# Patient Record
Sex: FEMALE | Race: WHITE | NOT HISPANIC OR LATINO | URBAN - METROPOLITAN AREA
[De-identification: names, ages, dates, MRNs, and addresses within clinical notes are randomized per-mention and may not be internally consistent; named-entity substitution may affect disease eponyms.]

---

## 2018-09-01 ENCOUNTER — INPATIENT (INPATIENT)
Facility: HOSPITAL | Age: 83
LOS: 10 days | Discharge: NURSING, REHAB OR LONG-TERM CARE | DRG: 563 | End: 2018-09-12
Attending: STUDENT IN AN ORGANIZED HEALTH CARE EDUCATION/TRAINING PROGRAM | Admitting: STUDENT IN AN ORGANIZED HEALTH CARE EDUCATION/TRAINING PROGRAM
Payer: MEDICARE

## 2018-09-01 VITALS
DIASTOLIC BLOOD PRESSURE: 68 MMHG | RESPIRATION RATE: 16 BRPM | TEMPERATURE: 98 F | OXYGEN SATURATION: 96 % | HEART RATE: 52 BPM | SYSTOLIC BLOOD PRESSURE: 97 MMHG | WEIGHT: 110.01 LBS

## 2018-09-01 DIAGNOSIS — I10 ESSENTIAL (PRIMARY) HYPERTENSION: ICD-10-CM

## 2018-09-01 DIAGNOSIS — N39.0 URINARY TRACT INFECTION, SITE NOT SPECIFIED: ICD-10-CM

## 2018-09-01 DIAGNOSIS — Z91.89 OTHER SPECIFIED PERSONAL RISK FACTORS, NOT ELSEWHERE CLASSIFIED: ICD-10-CM

## 2018-09-01 DIAGNOSIS — F32.9 MAJOR DEPRESSIVE DISORDER, SINGLE EPISODE, UNSPECIFIED: ICD-10-CM

## 2018-09-01 DIAGNOSIS — E03.9 HYPOTHYROIDISM, UNSPECIFIED: ICD-10-CM

## 2018-09-01 DIAGNOSIS — K21.9 GASTRO-ESOPHAGEAL REFLUX DISEASE WITHOUT ESOPHAGITIS: ICD-10-CM

## 2018-09-01 DIAGNOSIS — R63.8 OTHER SYMPTOMS AND SIGNS CONCERNING FOOD AND FLUID INTAKE: ICD-10-CM
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
## 2018-09-01 DIAGNOSIS — Z29.9 ENCOUNTER FOR PROPHYLACTIC MEASURES, UNSPECIFIED: ICD-10-CM

## 2018-09-01 LAB
ALBUMIN SERPL ELPH-MCNC: 3.8 G/DL — SIGNIFICANT CHANGE UP (ref 3.3–5)
ALP SERPL-CCNC: 54 U/L — SIGNIFICANT CHANGE UP (ref 40–120)
ALT FLD-CCNC: 10 U/L — SIGNIFICANT CHANGE UP (ref 10–45)
ANION GAP SERPL CALC-SCNC: 12 MMOL/L — SIGNIFICANT CHANGE UP (ref 5–17)
APPEARANCE UR: CLEAR — SIGNIFICANT CHANGE UP
APTT BLD: 29.7 SEC — SIGNIFICANT CHANGE UP (ref 27.5–37.4)
AST SERPL-CCNC: 14 U/L — SIGNIFICANT CHANGE UP (ref 10–40)
BASOPHILS NFR BLD AUTO: 0.4 % — SIGNIFICANT CHANGE UP (ref 0–2)
BILIRUB SERPL-MCNC: 0.3 MG/DL — SIGNIFICANT CHANGE UP (ref 0.2–1.2)
BILIRUB UR-MCNC: NEGATIVE — SIGNIFICANT CHANGE UP
BUN SERPL-MCNC: 28 MG/DL — HIGH (ref 7–23)
CALCIUM SERPL-MCNC: 9.7 MG/DL — SIGNIFICANT CHANGE UP (ref 8.4–10.5)
CHLORIDE SERPL-SCNC: 96 MMOL/L — SIGNIFICANT CHANGE UP (ref 96–108)
CO2 SERPL-SCNC: 27 MMOL/L — SIGNIFICANT CHANGE UP (ref 22–31)
COLOR SPEC: YELLOW — SIGNIFICANT CHANGE UP
CREAT SERPL-MCNC: 0.92 MG/DL — SIGNIFICANT CHANGE UP (ref 0.5–1.3)
DIFF PNL FLD: ABNORMAL
EOSINOPHIL NFR BLD AUTO: 3.6 % — SIGNIFICANT CHANGE UP (ref 0–6)
GLUCOSE SERPL-MCNC: 104 MG/DL — HIGH (ref 70–99)
GLUCOSE UR QL: NEGATIVE — SIGNIFICANT CHANGE UP
HCT VFR BLD CALC: 36.4 % — SIGNIFICANT CHANGE UP (ref 34.5–45)
HGB BLD-MCNC: 12.1 G/DL — SIGNIFICANT CHANGE UP (ref 11.5–15.5)
INR BLD: 1.04 — SIGNIFICANT CHANGE UP (ref 0.88–1.16)
KETONES UR-MCNC: NEGATIVE — SIGNIFICANT CHANGE UP
LEUKOCYTE ESTERASE UR-ACNC: ABNORMAL
LYMPHOCYTES # BLD AUTO: 18.6 % — SIGNIFICANT CHANGE UP (ref 13–44)
MCHC RBC-ENTMCNC: 31.2 PG — SIGNIFICANT CHANGE UP (ref 27–34)
MCHC RBC-ENTMCNC: 33.2 G/DL — SIGNIFICANT CHANGE UP (ref 32–36)
MCV RBC AUTO: 93.8 FL — SIGNIFICANT CHANGE UP (ref 80–100)
MONOCYTES NFR BLD AUTO: 12 % — SIGNIFICANT CHANGE UP (ref 2–14)
NEUTROPHILS NFR BLD AUTO: 65.4 % — SIGNIFICANT CHANGE UP (ref 43–77)
NITRITE UR-MCNC: NEGATIVE — SIGNIFICANT CHANGE UP
PH UR: 6 — SIGNIFICANT CHANGE UP (ref 5–8)
PLATELET # BLD AUTO: 244 K/UL — SIGNIFICANT CHANGE UP (ref 150–400)
POTASSIUM SERPL-MCNC: 4.3 MMOL/L — SIGNIFICANT CHANGE UP (ref 3.5–5.3)
POTASSIUM SERPL-SCNC: 4.3 MMOL/L — SIGNIFICANT CHANGE UP (ref 3.5–5.3)
PROT SERPL-MCNC: 7.2 G/DL — SIGNIFICANT CHANGE UP (ref 6–8.3)
PROT UR-MCNC: NEGATIVE MG/DL — SIGNIFICANT CHANGE UP
PROTHROM AB SERPL-ACNC: 11.6 SEC — SIGNIFICANT CHANGE UP (ref 9.8–12.7)
RBC # BLD: 3.88 M/UL — SIGNIFICANT CHANGE UP (ref 3.8–5.2)
RBC # FLD: 14.2 % — SIGNIFICANT CHANGE UP (ref 10.3–16.9)
SODIUM SERPL-SCNC: 135 MMOL/L — SIGNIFICANT CHANGE UP (ref 135–145)
SP GR SPEC: <=1.005 — SIGNIFICANT CHANGE UP (ref 1–1.03)
TROPONIN T SERPL-MCNC: 0.01 NG/ML — SIGNIFICANT CHANGE UP (ref 0–0.01)
TSH SERPL-MCNC: 1.19 UIU/ML — SIGNIFICANT CHANGE UP (ref 0.35–4.94)
UROBILINOGEN FLD QL: 0.2 E.U./DL — SIGNIFICANT CHANGE UP
WBC # BLD: 7.6 K/UL — SIGNIFICANT CHANGE UP (ref 3.8–10.5)
WBC # FLD AUTO: 7.6 K/UL — SIGNIFICANT CHANGE UP (ref 3.8–10.5)

## 2018-09-01 PROCEDURE — 93010 ELECTROCARDIOGRAM REPORT: CPT

## 2018-09-01 PROCEDURE — 99285 EMERGENCY DEPT VISIT HI MDM: CPT | Mod: 25

## 2018-09-01 PROCEDURE — 70450 CT HEAD/BRAIN W/O DYE: CPT | Mod: 26

## 2018-09-01 PROCEDURE — 71045 X-RAY EXAM CHEST 1 VIEW: CPT | Mod: 26

## 2018-09-01 PROCEDURE — 73030 X-RAY EXAM OF SHOULDER: CPT | Mod: 26,RT

## 2018-09-01 PROCEDURE — 72170 X-RAY EXAM OF PELVIS: CPT | Mod: 26

## 2018-09-01 PROCEDURE — 99223 1ST HOSP IP/OBS HIGH 75: CPT | Mod: GC

## 2018-09-01 PROCEDURE — 72125 CT NECK SPINE W/O DYE: CPT | Mod: 26

## 2018-09-01 RX ORDER — CEFTRIAXONE 500 MG/1
1 INJECTION, POWDER, FOR SOLUTION INTRAMUSCULAR; INTRAVENOUS EVERY 24 HOURS
Qty: 0 | Refills: 0 | Status: DISCONTINUED | OUTPATIENT
Start: 2018-09-01 | End: 2018-09-04

## 2018-09-01 RX ORDER — MIRTAZAPINE 45 MG/1
15 TABLET, ORALLY DISINTEGRATING ORAL DAILY
Qty: 0 | Refills: 0 | Status: DISCONTINUED | OUTPATIENT
Start: 2018-09-01 | End: 2018-09-12

## 2018-09-01 RX ORDER — MIRTAZAPINE 45 MG/1
15 TABLET, ORALLY DISINTEGRATING ORAL
Qty: 0 | Refills: 0 | COMMUNITY

## 2018-09-01 RX ORDER — AMLODIPINE BESYLATE 2.5 MG/1
2.5 TABLET ORAL
Qty: 0 | Refills: 0 | COMMUNITY

## 2018-09-01 RX ORDER — ESCITALOPRAM OXALATE 10 MG/1
0 TABLET, FILM COATED ORAL
Qty: 0 | Refills: 0 | COMMUNITY

## 2018-09-01 RX ORDER — PANTOPRAZOLE SODIUM 20 MG/1
40 TABLET, DELAYED RELEASE ORAL
Qty: 0 | Refills: 0 | Status: DISCONTINUED | OUTPATIENT
Start: 2018-09-01 | End: 2018-09-12

## 2018-09-01 RX ORDER — LISINOPRIL 2.5 MG/1
0 TABLET ORAL
Qty: 0 | Refills: 0 | COMMUNITY

## 2018-09-01 RX ORDER — TETANUS TOXOID, REDUCED DIPHTHERIA TOXOID AND ACELLULAR PERTUSSIS VACCINE, ADSORBED 5; 2.5; 8; 8; 2.5 [IU]/.5ML; [IU]/.5ML; UG/.5ML; UG/.5ML; UG/.5ML
0.5 SUSPENSION INTRAMUSCULAR ONCE
Qty: 0 | Refills: 0 | Status: COMPLETED | OUTPATIENT
Start: 2018-09-01 | End: 2018-09-01

## 2018-09-01 RX ORDER — ESOMEPRAZOLE MAGNESIUM 40 MG/1
0 CAPSULE, DELAYED RELEASE ORAL
Qty: 0 | Refills: 0 | COMMUNITY

## 2018-09-01 RX ORDER — ESCITALOPRAM OXALATE 10 MG/1
5 TABLET, FILM COATED ORAL DAILY
Qty: 0 | Refills: 0 | Status: DISCONTINUED | OUTPATIENT
Start: 2018-09-01 | End: 2018-09-12

## 2018-09-01 RX ORDER — MIRTAZAPINE 45 MG/1
0 TABLET, ORALLY DISINTEGRATING ORAL
Qty: 0 | Refills: 0 | COMMUNITY

## 2018-09-01 RX ORDER — THYROID 120 MG
1 TABLET ORAL
Qty: 0 | Refills: 0 | COMMUNITY

## 2018-09-01 RX ORDER — IBUPROFEN 200 MG
600 TABLET ORAL ONCE
Qty: 0 | Refills: 0 | Status: COMPLETED | OUTPATIENT
Start: 2018-09-01 | End: 2018-09-01

## 2018-09-01 RX ORDER — FUROSEMIDE 40 MG
1 TABLET ORAL
Qty: 0 | Refills: 0 | COMMUNITY

## 2018-09-01 RX ORDER — SODIUM CHLORIDE 9 MG/ML
500 INJECTION INTRAMUSCULAR; INTRAVENOUS; SUBCUTANEOUS ONCE
Qty: 0 | Refills: 0 | Status: DISCONTINUED | OUTPATIENT
Start: 2018-09-01 | End: 2018-09-01

## 2018-09-01 RX ORDER — LEVOTHYROXINE SODIUM 125 MCG
25 TABLET ORAL DAILY
Qty: 0 | Refills: 0 | Status: DISCONTINUED | OUTPATIENT
Start: 2018-09-01 | End: 2018-09-12

## 2018-09-01 RX ORDER — HEPARIN SODIUM 5000 [USP'U]/ML
5000 INJECTION INTRAVENOUS; SUBCUTANEOUS EVERY 12 HOURS
Qty: 0 | Refills: 0 | Status: DISCONTINUED | OUTPATIENT
Start: 2018-09-01 | End: 2018-09-12

## 2018-09-01 RX ORDER — LISINOPRIL 2.5 MG/1
40 TABLET ORAL
Qty: 0 | Refills: 0 | COMMUNITY

## 2018-09-01 RX ORDER — ASPIRIN/CALCIUM CARB/MAGNESIUM 324 MG
1 TABLET ORAL
Qty: 0 | Refills: 0 | COMMUNITY

## 2018-09-01 RX ORDER — ESCITALOPRAM OXALATE 10 MG/1
5 TABLET, FILM COATED ORAL
Qty: 0 | Refills: 0 | COMMUNITY

## 2018-09-01 RX ORDER — ESOMEPRAZOLE MAGNESIUM 40 MG/1
40 CAPSULE, DELAYED RELEASE ORAL
Qty: 0 | Refills: 0 | COMMUNITY

## 2018-09-01 RX ORDER — INFLUENZA VIRUS VACCINE 15; 15; 15; 15 UG/.5ML; UG/.5ML; UG/.5ML; UG/.5ML
0.5 SUSPENSION INTRAMUSCULAR ONCE
Qty: 0 | Refills: 0 | Status: DISCONTINUED | OUTPATIENT
Start: 2018-09-01 | End: 2018-09-05

## 2018-09-01 RX ORDER — RANITIDINE HYDROCHLORIDE 150 MG/1
1 TABLET, FILM COATED ORAL
Qty: 0 | Refills: 0 | COMMUNITY

## 2018-09-01 RX ORDER — ACETAMINOPHEN 500 MG
975 TABLET ORAL ONCE
Qty: 0 | Refills: 0 | Status: COMPLETED | OUTPATIENT
Start: 2018-09-01 | End: 2018-09-01

## 2018-09-01 RX ORDER — AMLODIPINE BESYLATE 2.5 MG/1
0 TABLET ORAL
Qty: 0 | Refills: 0 | COMMUNITY

## 2018-09-01 RX ORDER — ACETAMINOPHEN 500 MG
650 TABLET ORAL EVERY 6 HOURS
Qty: 0 | Refills: 0 | Status: DISCONTINUED | OUTPATIENT
Start: 2018-09-01 | End: 2018-09-12

## 2018-09-01 RX ADMIN — Medication 975 MILLIGRAM(S): at 10:05

## 2018-09-01 RX ADMIN — HEPARIN SODIUM 5000 UNIT(S): 5000 INJECTION INTRAVENOUS; SUBCUTANEOUS at 17:05

## 2018-09-01 RX ADMIN — SODIUM CHLORIDE 500 MILLILITER(S): 9 INJECTION INTRAMUSCULAR; INTRAVENOUS; SUBCUTANEOUS at 08:33

## 2018-09-01 RX ADMIN — Medication 600 MILLIGRAM(S): at 20:00

## 2018-09-01 RX ADMIN — SODIUM CHLORIDE 500 MILLILITER(S): 9 INJECTION INTRAMUSCULAR; INTRAVENOUS; SUBCUTANEOUS at 12:28

## 2018-09-01 RX ADMIN — TETANUS TOXOID, REDUCED DIPHTHERIA TOXOID AND ACELLULAR PERTUSSIS VACCINE, ADSORBED 0.5 MILLILITER(S): 5; 2.5; 8; 8; 2.5 SUSPENSION INTRAMUSCULAR at 07:33

## 2018-09-01 RX ADMIN — CEFTRIAXONE 100 GRAM(S): 500 INJECTION, POWDER, FOR SOLUTION INTRAMUSCULAR; INTRAVENOUS at 16:16

## 2018-09-01 RX ADMIN — Medication 600 MILLIGRAM(S): at 19:30

## 2018-09-01 RX ADMIN — Medication 650 MILLIGRAM(S): at 14:27

## 2018-09-01 RX ADMIN — Medication 975 MILLIGRAM(S): at 12:28

## 2018-09-01 RX ADMIN — Medication 650 MILLIGRAM(S): at 15:00

## 2018-09-01 NOTE — ED PROVIDER NOTE - ATTENDING CONTRIBUTION TO CARE
89F with home health aide, fell out of bed struck R side of head, shoulder. Doubt syncope given that pt screamed out to HHA right away. Pt's vitals notable for low heart rate, low blood pressure, pt denies cp, sob. Hx limited due to pt with dementia. Plan for screening xr including shoulder, pelvis, cxr. Plan for basic labs and head/cervical spine CT. will repair laceration on posterior scalp. Unclear cause of fall.

## 2018-09-01 NOTE — H&P ADULT - PROBLEM SELECTOR PLAN 1
-Patient presents after fall at home, likely mechanical as patient as trying to get up out of bed.  Patient with dementia and ppor historian and can't contribute to eents prior to fall.  Home health aide hearrd patient's voice immediately after fall so less likely syncope.  No evidence of cardiac event, possibly vasovagal vs dehydratino.  -Patient already received IVF and will enocurage PO intake.  -EKG with no ischemic events but read states no P waves although P waves eveident, felipe obtain another EKG.  -CT head and spine without acute pathology  -Will follow up xrays of hip and shoulder  -Tyelenol PRN for pain  -PT consult -Patient presents after fall at home, likely mechanical as patient as trying to get up out of bed.  Patient with dementia and poor historian and can't contribute to events prior to fall.  Home health aide heard patient's voice immediately after fall so less likely syncope.  No evidence of cardiac event, possibly vasovagal vs dehydratino.  -Patient already received IVF and will enocurage PO intake.  -EKG with no ischemic events but read states no P waves although P waves evident, will obtain another EKG.  -CT head and spine without acute pathology  -Will follow up xrays of hip and shoulder  -Tylenol PRN for pain  -PT consult -Patient presents after fall at home, likely mechanical as patient as trying to get up out of bed.  Patient with dementia and poor historian and can't contribute to events prior to fall.  Home health aide heard patient's voice immediately after fall so less likely syncope.  No evidence of cardiac event, possibly vasovagal vs dehydration.  -Patient already received IVF and will encourage PO intake.  -EKG with no ischemic events but read states no P waves although P waves evident, will obtain another EKG.  -CT head and spine without acute pathology  -Will follow up xrays of hip and shoulder  -Tylenol PRN for pain  -PT consult

## 2018-09-01 NOTE — H&P ADULT - PROBLEM SELECTOR PLAN 6
Problem: Transition of care performed with sharing of clinical summary.  Plan: 1) PCP Contacted on Admission: (Y/N) --> Name & Phone #:  Dr Russ Alvarez [phone] or [phone]  2) Date of Contact with PCP:  3) PCP Contacted at Discharge: (Y/N, N/A)  4) Summary of Handoff Given to PCP:   5) Post-Discharge Appointment Date and Location: -Patient with a history of depression, continue home doses of mirtazapine 15mg and escitalopram 5mg daily.

## 2018-09-01 NOTE — ED PROVIDER NOTE - MEDICAL DECISION MAKING DETAILS
89 yo fem with unwitnessed fall at home: unclear circumstances leading to fall at home.  Has head trauma, will get CT head and c-spine.  Right shoulder pain: will XR.  Mildly bradycardic, not on BB.  Get EKG, cardiac monitor.  Will check labs.  Dispo pending test results; will probably require admission. 89 yo fem with unwitnessed fall at home: unclear circumstances leading to fall at home, but does not seem that there was a loss of consciousness.  Has head trauma, will get CT head and c-spine.  Right shoulder pain: will XR.  Mildly bradycardic to 50s, not on BB.  Get EKG, cardiac monitor.  Will check labs.  Dispo pending test results; will probably require admission.

## 2018-09-01 NOTE — H&P ADULT - PROBLEM SELECTOR PROBLEM 3
Transition of care performed with sharing of clinical summary GERD (gastroesophageal reflux disease) HTN (hypertension)

## 2018-09-01 NOTE — ED ADULT TRIAGE NOTE - ARRIVAL INFO ADDITIONAL COMMENTS
Pt BIBA c/o head injury with no loc after falling out of bed this morning. Per pt caregiver pt takes ASA daily.

## 2018-09-01 NOTE — ED PROVIDER NOTE - CARE PLAN
Principal Discharge DX:	Fall, initial encounter  Secondary Diagnosis:	Shoulder separation, right, initial encounter  Secondary Diagnosis:	Scalp hematoma, initial encounter

## 2018-09-01 NOTE — ED PROVIDER NOTE - PHYSICAL EXAMINATION
CONSTITUTIONAL: thin elderly female in NAD  SKIN: Normal color and turgor. No rash.    HEAD: Right parietal scalp hematoma with superficial 1 cm central skin tear, not actively bleeding.    EYES: Conjunctiva clear. EOMI. PERRL.    ENT: Airway patent, OP clear. Nasal mucosa clear, no rhinorrhea.  No otorrhea or hemotympanum.  RESPIRATORY:  Breathing non-labored. No retractions or accessory muscle use.  Lungs CTA bilat.  CARDIOVASCULAR:  Bradycardic apx 50/min, occasional ectopic beat. No M/R/G.      GI:  Abdomen soft, nontender.    MSK: Neck supple with painless ROM.  No midline neck tenderness.  No lower extremity edema or calf tenderness.  Soft tissue swelling and tenderness anterior right glenoid region.  ROM right shoulder limited.    NEURO: Awake and alert.  CN: grossly intact. Motor 5/5 in all extremities.  SILT all extremities.

## 2018-09-01 NOTE — ED PROVIDER NOTE - OBJECTIVE STATEMENT
pt is 89 yo fem with Hx HTN, hypothyroidism, dementia BIBA after fall at home.  Unwitnessed fall but HHA heard pt fall from the next room; pt cried out for help immediately after HHA heard the fall.  She found patient on floor at foot of her bed, awake with bleeding from scalp laceration.  Pt c/o pain in right shoulder.  She is unable to contribute to events leading to fall due to dementia.

## 2018-09-01 NOTE — H&P ADULT - NSHPPHYSICALEXAM_GEN_ALL_CORE
.  VITAL SIGNS:  T(C): 36.4 (09-01-18 @ 07:10), Max: 36.4 (09-01-18 @ 07:10)  T(F): 97.5 (09-01-18 @ 07:10), Max: 97.5 (09-01-18 @ 07:10)  HR: 52 (09-01-18 @ 07:10) (52 - 52)  BP: 97/68 (09-01-18 @ 07:10) (97/68 - 97/68)  BP(mean): --  RR: 16 (09-01-18 @ 07:10) (16 - 16)  SpO2: 96% (09-01-18 @ 07:10) (96% - 96%)  Wt(kg): --    PHYSICAL EXAM:    Constitutional: WDWN resting comfortably in bed; NAD  Head: NC/AT  Eyes: PERRL, EOMI, anicteric sclera  ENT: no nasal discharge; uvula midline, no oropharyngeal erythema or exudates; MMM  Neck: supple; no JVD or thyromegaly  Respiratory: CTA B/L; no W/R/R, no retractions  Cardiac: +S1/S2; RRR; no M/R/G; PMI non-displaced  Gastrointestinal: soft, NT/ND; no rebound or guarding; +BSx4  Genitourinary: normal external genitalia  Back: spine midline, no bony tenderness or step-offs; no CVAT B/L  Extremities: WWP, no clubbing or cyanosis; no peripheral edema  Musculoskeletal: NROM x4; no joint swelling, tenderness or erythema  Vascular: 2+ radial, femoral, DP/PT pulses B/L  Dermatologic: skin warm, dry and intact; no rashes, wounds, or scars  Lymphatic: no submandibular or cervical LAD  Neurologic: AAOx3; CNII-XII grossly intact; no focal deficits  Psychiatric: affect and characteristics of appearance, verbalizations, behaviors are appropriate Constitutional: Frail elderly female laying in bed in NAD, intermittently saying "help me, help me" (baseline per home health aide)  Head: Large R parietal hematoma with residual blood in hair, facial hair noted above upper lip and below chin  Eyes: PERRL, EOMI, anicteric sclera  ENT: no nasal discharge; uvula midline, no oropharyngeal erythema or exudates; dry mucous membranes  Neck: supple; no JVD or thyromegaly  Respiratory: CTA B/L; no W/R/R, no retractions  Cardiac: +S1/S2; bradycardic, occasional PVCs; no M/R/G; PMI non-displaced  Gastrointestinal: soft, NT/ND; no rebound or guarding; +BSx4  Extremities: WWP, no clubbing or cyanosis; no peripheral edema  Musculoskeletal: Moderate swelling of right shoulder joint  Vascular: 2+ radial, femoral, DP/PT pulses B/L  Neurologic: AAOx1 to person  Psychiatric: affect and characteristics of appearance, verbalizations, behaviors are appropriate

## 2018-09-01 NOTE — H&P ADULT - PROBLEM SELECTOR PLAN 3
-Patient with a history of GERD, will continue home esomeprazole 40mg daily. -Patient with a history of hypertension however hypotensive on admission, takes Lisinopril 40mg and amlodipine 2.5mg at home, will hold for now and restart as BP tolerates.

## 2018-09-01 NOTE — H&P ADULT - PROBLEM SELECTOR PLAN 7
-No IVF indicated  -Will replete to K>4 and Mg>2  -DASH/TLC diet  -Dispo RMF  -DNR/DNI Problem: Transition of care performed with sharing of clinical summary.  Plan: 1) PCP Contacted on Admission: (Y/N) --> Name & Phone #:  Dr Russ Alvarez [phone] or [phone]  2) Date of Contact with PCP:  3) PCP Contacted at Discharge: (Y/N, N/A)  4) Summary of Handoff Given to PCP:   5) Post-Discharge Appointment Date and Location:

## 2018-09-01 NOTE — H&P ADULT - NSHPLABSRESULTS_GEN_ALL_CORE
.  LABS:                         12.1   7.6   )-----------( 244      ( 01 Sep 2018 07:48 )             36.4     09-01    135  |  96  |  28<H>  ----------------------------<  104<H>  4.3   |  27  |  0.92    Ca    9.7      01 Sep 2018 07:48    TPro  7.2  /  Alb  3.8  /  TBili  0.3  /  DBili  x   /  AST  14  /  ALT  10  /  AlkPhos  54  09-01    PT/INR - ( 01 Sep 2018 07:48 )   PT: 11.6 sec;   INR: 1.04          PTT - ( 01 Sep 2018 07:48 )  PTT:29.7 sec  Urinalysis Basic - ( 01 Sep 2018 10:18 )    Color: Yellow / Appearance: Clear / SG: <=1.005 / pH: x  Gluc: x / Ketone: NEGATIVE  / Bili: Negative / Urobili: 0.2 E.U./dL   Blood: x / Protein: NEGATIVE mg/dL / Nitrite: NEGATIVE   Leuk Esterase: Large / RBC: 5-10 /HPF / WBC see note /HPF   Sq Epi: x / Non Sq Epi: 0-5 /HPF / Bacteria: Many /HPF      CARDIAC MARKERS ( 01 Sep 2018 07:48 )  x     / 0.01 ng/mL / x     / x     / x                RADIOLOGY, EKG & ADDITIONAL TESTS: Reviewed.

## 2018-09-01 NOTE — ED ADULT NURSE NOTE - OBJECTIVE STATEMENT
pt's HHA states she left the pt alone for a few seconds and heard a thump.  when she returned the pt was on the floor next to the bed.  arrives confused.  c/o right shoulder pain.  pt moves arm but yells in pain.  no deformity noted.  bruising noted on both forearms.  mod swelling to right side of head with abrasion. pt's HHA states she left the pt alone for a few seconds and heard a thump.  when she returned the pt was on the floor next to the bed.  arrives confused.  c/o right shoulder pain.  large amount of swelling to shoulder.   pt moves arm but yells in pain.  no deformity noted.  bruising noted on both forearms.  mod swelling to right side of head with abrasion.

## 2018-09-01 NOTE — H&P ADULT - PROBLEM SELECTOR PLAN 2
-Patient with a history of hypertension however hypotensive on admission, takes Lisinopril 40mg and amlodipine 2.5mg at home, will hold for now and restart as BP tolerates. -Patient with foul smelling, cloudy urine and + UA, will start on ceftriaxone 1g daily

## 2018-09-01 NOTE — H&P ADULT - PROBLEM SELECTOR PLAN 8
-HSQ  -Esomeprazole 40mg daily -No IVF indicated  -Will replete to K>4 and Mg>2  -DASH/TLC diet  -Dispo RMF  -Full Code until we can establish wishes of patient

## 2018-09-01 NOTE — ED ADULT NURSE NOTE - NSIMPLEMENTINTERV_GEN_ALL_ED
Implemented All Fall with Harm Risk Interventions:  Magnet to call system. Call bell, personal items and telephone within reach. Instruct patient to call for assistance. Room bathroom lighting operational. Non-slip footwear when patient is off stretcher. Physically safe environment: no spills, clutter or unnecessary equipment. Stretcher in lowest position, wheels locked, appropriate side rails in place. Provide visual cue, wrist band, yellow gown, etc. Monitor gait and stability. Monitor for mental status changes and reorient to person, place, and time. Review medications for side effects contributing to fall risk. Reinforce activity limits and safety measures with patient and family. Provide visual clues: red socks.

## 2018-09-01 NOTE — H&P ADULT - PROBLEM SELECTOR PROBLEM 4
Nutrition, metabolism, and development symptoms Hypothyroidism GERD (gastroesophageal reflux disease)

## 2018-09-01 NOTE — H&P ADULT - ASSESSMENT
89 year old female with PMH HTN, GERD, depression, dementia, and hypothyroidism who presents after a fall. 89 year old female with PMH OA, questionable CKD, HTN, GERD, depression, dementia, and hypothyroidism who presents after a fall.

## 2018-09-01 NOTE — H&P ADULT - HISTORY OF PRESENT ILLNESS
89 year old female with PMH HTN, dementia, and hypothyroidism who presents after a fall.  Patient's home health aide was in the bathroom and she heard a loud sound in the bedroom.  Immeditaeyl after that the aide heard the patient crying out for help and she found her at the foot of the bed bleeding from her scalp.  In the ED, vital signs were BP 97/68, HR 52, RR 16, temp 97.5 F and satuarting 95% on room air. Patient's labs were unremarkable.  CT head without acute pathology.  She was given IV fluids and tylenol for pain. 89 year old female with PMH HTN, GERD, depression, dementia, and hypothyroidism who presents after a fall.  Patient's home health aide was in the bathroom and she heard a loud sound in the bedroom.  Immediately after that the aide heard the patient crying out for help and she found her at the foot of the bed bleeding from her scalp.  In the ED, vital signs were BP 97/68, HR 52, RR 16, temp 97.5 F and saturating 95% on room air. Patient's labs were unremarkable.  CT head without acute pathology.  She was given IV fluids and tylenol for pain. 89 year old female with PMH OA, questionable CKD, HTN, GERD, depression, dementia, and hypothyroidism who presents after a fall.  Patient's home health aide was in the bathroom and she heard a loud sound in the bedroom.  Immediately after that the aide heard the patient crying out for help and she found her at the foot of the bed bleeding from her scalp.  In the ED, vital signs were BP 97/68, HR 52, RR 16, temp 97.5 F and saturating 95% on room air. Patient's labs were unremarkable.  CT head without acute pathology.  She was given IV fluids and tylenol for pain.

## 2018-09-01 NOTE — H&P ADULT - PROBLEM SELECTOR PLAN 4
-Patient with a history of hypothyroidism, will continue home NP thyroid 30mg daily. -Patient with a history of GERD, will continue home esomeprazole 40mg daily.

## 2018-09-01 NOTE — H&P ADULT - PROBLEM SELECTOR PLAN 5
-Patient with a history of depression, continue home doses of mirtazapine 15mg and escitalopram 5mg daily. -Patient with a history of hypothyroidism, will continue home NP thyroid 30mg daily.

## 2018-09-01 NOTE — H&P ADULT - ATTENDING COMMENTS
UTI:  may have contributed to worsening functional status leading to fall.    start Ctxn.    Has HHA at home ATC.  No other known family.  Contact house call PCP to clarify HCP and code status.

## 2018-09-01 NOTE — H&P ADULT - NSHPREVIEWOFSYSTEMS_GEN_ALL_CORE
REVIEW OF SYSTEMS:    CONSTITUTIONAL: No weakness, fevers or chills  EYES/ENT: No visual changes;  No vertigo or throat pain   NECK: No pain or stiffness  RESPIRATORY: No cough, wheezing, hemoptysis; No shortness of breath  CARDIOVASCULAR: No chest pain or palpitations  GASTROINTESTINAL: No abdominal or epigastric pain. No nausea, vomiting, or hematemesis; No diarrhea or constipation. No melena or hematochezia.  GENITOURINARY: No dysuria, frequency or hematuria  NEUROLOGICAL: No numbness or weakness  SKIN: No itching, burning, rashes, or lesions   All other review of systems is negative unless indicated above. Patient not always answering questions appropriately     CONSTITUTIONAL: No weakness, fevers or chills  EYES/ENT: No visual changes;  No vertigo or throat pain   NECK: No pain or stiffness  RESPIRATORY: No cough, wheezing, hemoptysis; No shortness of breath  CARDIOVASCULAR: No chest pain or palpitations  GASTROINTESTINAL: No abdominal or epigastric pain. No nausea, vomiting, or hematemesis; No diarrhea or constipation. No melena or hematochezia.  GENITOURINARY: No dysuria, frequency or hematuria  NEUROLOGICAL: Endorses headaches, no numbness or weakness  SKIN: No itching, burning, rashes, or lesions  MSK: Tenderness of R shoulder  All other review of systems is negative unless indicated above. Patient not always answers questions appropriately 2/2 dementia    CONSTITUTIONAL: No weakness, fevers or chills  EYES/ENT: No visual changes;  No vertigo or throat pain   NECK: No pain or stiffness  RESPIRATORY: No cough, wheezing, hemoptysis; No shortness of breath  CARDIOVASCULAR: No chest pain or palpitations  GASTROINTESTINAL: No abdominal or epigastric pain. No nausea, vomiting, or hematemesis; No diarrhea or constipation. No melena or hematochezia.  GENITOURINARY: No dysuria, frequency or hematuria  NEUROLOGICAL: Endorses headaches, no numbness or weakness  SKIN: No itching, burning, rashes, or lesions  MSK: Tenderness of R shoulder  All other review of systems is negative unless indicated above.

## 2018-09-02 DIAGNOSIS — I10 ESSENTIAL (PRIMARY) HYPERTENSION: ICD-10-CM

## 2018-09-02 LAB
ANION GAP SERPL CALC-SCNC: 13 MMOL/L — SIGNIFICANT CHANGE UP (ref 5–17)
BUN SERPL-MCNC: 25 MG/DL — HIGH (ref 7–23)
CALCIUM SERPL-MCNC: 9.3 MG/DL — SIGNIFICANT CHANGE UP (ref 8.4–10.5)
CHLORIDE SERPL-SCNC: 100 MMOL/L — SIGNIFICANT CHANGE UP (ref 96–108)
CO2 SERPL-SCNC: 23 MMOL/L — SIGNIFICANT CHANGE UP (ref 22–31)
CREAT SERPL-MCNC: 0.9 MG/DL — SIGNIFICANT CHANGE UP (ref 0.5–1.3)
GLUCOSE BLDC GLUCOMTR-MCNC: 112 MG/DL — HIGH (ref 70–99)
GLUCOSE SERPL-MCNC: 96 MG/DL — SIGNIFICANT CHANGE UP (ref 70–99)
HCT VFR BLD CALC: 35.7 % — SIGNIFICANT CHANGE UP (ref 34.5–45)
HGB BLD-MCNC: 11.9 G/DL — SIGNIFICANT CHANGE UP (ref 11.5–15.5)
MAGNESIUM SERPL-MCNC: 2.1 MG/DL — SIGNIFICANT CHANGE UP (ref 1.6–2.6)
MCHC RBC-ENTMCNC: 30.8 PG — SIGNIFICANT CHANGE UP (ref 27–34)
MCHC RBC-ENTMCNC: 33.3 G/DL — SIGNIFICANT CHANGE UP (ref 32–36)
MCV RBC AUTO: 92.5 FL — SIGNIFICANT CHANGE UP (ref 80–100)
PLATELET # BLD AUTO: 241 K/UL — SIGNIFICANT CHANGE UP (ref 150–400)
POTASSIUM SERPL-MCNC: 4.4 MMOL/L — SIGNIFICANT CHANGE UP (ref 3.5–5.3)
POTASSIUM SERPL-SCNC: 4.4 MMOL/L — SIGNIFICANT CHANGE UP (ref 3.5–5.3)
RBC # BLD: 3.86 M/UL — SIGNIFICANT CHANGE UP (ref 3.8–5.2)
RBC # FLD: 13.7 % — SIGNIFICANT CHANGE UP (ref 10.3–16.9)
SODIUM SERPL-SCNC: 136 MMOL/L — SIGNIFICANT CHANGE UP (ref 135–145)
WBC # BLD: 9.9 K/UL — SIGNIFICANT CHANGE UP (ref 3.8–10.5)
WBC # FLD AUTO: 9.9 K/UL — SIGNIFICANT CHANGE UP (ref 3.8–10.5)

## 2018-09-02 PROCEDURE — 99233 SBSQ HOSP IP/OBS HIGH 50: CPT

## 2018-09-02 RX ORDER — LISINOPRIL 2.5 MG/1
40 TABLET ORAL EVERY 24 HOURS
Qty: 0 | Refills: 0 | Status: DISCONTINUED | OUTPATIENT
Start: 2018-09-02 | End: 2018-09-09

## 2018-09-02 RX ORDER — QUETIAPINE FUMARATE 200 MG/1
25 TABLET, FILM COATED ORAL ONCE
Qty: 0 | Refills: 0 | Status: COMPLETED | OUTPATIENT
Start: 2018-09-02 | End: 2018-09-02

## 2018-09-02 RX ORDER — AMLODIPINE BESYLATE 2.5 MG/1
2.5 TABLET ORAL EVERY 24 HOURS
Qty: 0 | Refills: 0 | Status: DISCONTINUED | OUTPATIENT
Start: 2018-09-02 | End: 2018-09-03

## 2018-09-02 RX ORDER — AMLODIPINE BESYLATE 2.5 MG/1
2.5 TABLET ORAL DAILY
Qty: 0 | Refills: 0 | Status: DISCONTINUED | OUTPATIENT
Start: 2018-09-02 | End: 2018-09-02

## 2018-09-02 RX ADMIN — QUETIAPINE FUMARATE 25 MILLIGRAM(S): 200 TABLET, FILM COATED ORAL at 15:05

## 2018-09-02 RX ADMIN — Medication 650 MILLIGRAM(S): at 00:28

## 2018-09-02 RX ADMIN — MIRTAZAPINE 15 MILLIGRAM(S): 45 TABLET, ORALLY DISINTEGRATING ORAL at 11:07

## 2018-09-02 RX ADMIN — QUETIAPINE FUMARATE 25 MILLIGRAM(S): 200 TABLET, FILM COATED ORAL at 19:18

## 2018-09-02 RX ADMIN — ESCITALOPRAM OXALATE 5 MILLIGRAM(S): 10 TABLET, FILM COATED ORAL at 11:07

## 2018-09-02 RX ADMIN — HEPARIN SODIUM 5000 UNIT(S): 5000 INJECTION INTRAVENOUS; SUBCUTANEOUS at 19:17

## 2018-09-02 RX ADMIN — Medication 25 MICROGRAM(S): at 06:45

## 2018-09-02 RX ADMIN — PANTOPRAZOLE SODIUM 40 MILLIGRAM(S): 20 TABLET, DELAYED RELEASE ORAL at 06:45

## 2018-09-02 RX ADMIN — HEPARIN SODIUM 5000 UNIT(S): 5000 INJECTION INTRAVENOUS; SUBCUTANEOUS at 06:45

## 2018-09-02 RX ADMIN — Medication 650 MILLIGRAM(S): at 00:47

## 2018-09-02 RX ADMIN — AMLODIPINE BESYLATE 2.5 MILLIGRAM(S): 2.5 TABLET ORAL at 09:44

## 2018-09-02 RX ADMIN — CEFTRIAXONE 100 GRAM(S): 500 INJECTION, POWDER, FOR SOLUTION INTRAMUSCULAR; INTRAVENOUS at 15:05

## 2018-09-02 RX ADMIN — LISINOPRIL 40 MILLIGRAM(S): 2.5 TABLET ORAL at 15:05

## 2018-09-02 NOTE — PROGRESS NOTE ADULT - PROBLEM SELECTOR PLAN 1
-Patient presents after fall at home, likely mechanical as patient as trying to get up out of bed.  Patient with dementia and poor historian and can't contribute to events prior to fall.  Home health aide heard patient's voice immediately after fall so less likely syncope.  No evidence of cardiac event, possibly vasovagal vs dehydration.  -Patient already received IVF and will encourage PO intake.  -EKG with no ischemic events  -CT head and spine without acute pathology  -Will follow up xrays of hip and shoulder  -Tylenol PRN for pain  -PT consult placed, appreciate recs

## 2018-09-02 NOTE — PROGRESS NOTE ADULT - PROBLEM SELECTOR PLAN 3
-Patient with a history of hypertension however hypotensive on admission, takes Lisinopril 40mg and amlodipine 2.5mg at home, will hold for now and restart as BP tolerates.

## 2018-09-02 NOTE — PROGRESS NOTE ADULT - PROBLEM SELECTOR PLAN 6
BP elevated to 180s this AM  - restarted home amlodipine 2.5qD, will consider restarting lisinopril 10mg qD

## 2018-09-02 NOTE — PROGRESS NOTE ADULT - PROBLEM SELECTOR PLAN 8
Problem: Transition of care performed with sharing of clinical summary.  Plan: 1) PCP Contacted on Admission: (Y/N) --> Name & Phone #:  Dr Russ Alvarez [phone] or [phone]  2) Date of Contact with PCP:  3) PCP Contacted at Discharge: (Y/N, N/A)  4) Summary of Handoff Given to PCP:   5) Post-Discharge Appointment Date and Location:

## 2018-09-02 NOTE — PROGRESS NOTE ADULT - PROBLEM SELECTOR PLAN 7
-Patient with a history of depression, continue home doses of mirtazapine 15mg and escitalopram 5mg daily.

## 2018-09-02 NOTE — PROGRESS NOTE ADULT - PROBLEM SELECTOR PLAN 9
-No IVF indicated  -Will replete to K>4 and Mg>2  -DASH/TLC diet  -Dispo RMF  -Full Code until we can establish wishes of patient

## 2018-09-02 NOTE — PROGRESS NOTE ADULT - SUBJECTIVE AND OBJECTIVE BOX
OVERNIGHT EVENTS: Patient agitated overnight, trying to climb out of bed. Placed on enhanced observation.     SUBJECTIVE / INTERVAL HPI: Patient seen and examined at bedside. Denied all symptoms this AM. Denied f/c/n/v/ha/weakness/dizziness/cpsob.    VITAL SIGNS:  Vital Signs Last 24 Hrs  T(C): 36.5 (02 Sep 2018 06:29), Max: 36.6 (01 Sep 2018 22:00)  T(F): 97.7 (02 Sep 2018 06:29), Max: 97.9 (01 Sep 2018 22:00)  HR: 57 (02 Sep 2018 08:30) (45 - 68)  BP: 174/55 (02 Sep 2018 08:30) (108/66 - 180/82)  BP(mean): --  RR: 16 (02 Sep 2018 06:29) (14 - 16)  SpO2: 95% (02 Sep 2018 06:29) (87% - 98%)    PHYSICAL EXAM:    Constitutional: Frail elderly female laying in bed in NAD  Head: Large R parietal hematoma with residual blood in hair, facial hair noted above upper lip and below chin  Eyes: PERRL, EOMI, anicteric sclera  ENT: no nasal discharge; uvula midline, no oropharyngeal erythema or exudates; dry mucous membranes  Neck: supple; no JVD or thyromegaly  Respiratory: CTA B/L; no W/R/R, no retractions  Cardiac: +S1/S2; bradycardic; no M/R/G; PMI non-displaced  Gastrointestinal: soft, NT/ND; no rebound or guarding; +BSx4  Extremities: WWP, no clubbing or cyanosis; no peripheral edema  Musculoskeletal: Moderate swelling of right shoulder joint  Vascular: 2+ radial, femoral, DP/PT pulses B/L  Neurologic: AAOx2 to person and location "hospital", no focal deficits  Psychiatric: affect and characteristics of appearance, verbalizations, behaviors are appropriate    MEDICATIONS:  MEDICATIONS  (STANDING):  amLODIPine   Tablet 2.5 milliGRAM(s) Oral every 24 hours  cefTRIAXone   IVPB 1 Gram(s) IV Intermittent every 24 hours  escitalopram 5 milliGRAM(s) Oral daily  heparin  Injectable 5000 Unit(s) SubCutaneous every 12 hours  influenza   Vaccine 0.5 milliLiter(s) IntraMuscular once  levothyroxine 25 MICROGram(s) Oral daily  mirtazapine 15 milliGRAM(s) Oral daily  pantoprazole    Tablet 40 milliGRAM(s) Oral before breakfast    MEDICATIONS  (PRN):  acetaminophen   Tablet. 650 milliGRAM(s) Oral every 6 hours PRN Severe Pain (7 - 10)      ALLERGIES:  Allergies    No Known Allergies    Intolerances        LABS:                        11.9   9.9   )-----------( 241      ( 02 Sep 2018 07:45 )             35.7     09-02    136  |  100  |  25<H>  ----------------------------<  96  4.4   |  23  |  0.90    Ca    9.3      02 Sep 2018 07:45  Mg     2.1     09-02    TPro  7.2  /  Alb  3.8  /  TBili  0.3  /  DBili  x   /  AST  14  /  ALT  10  /  AlkPhos  54  09-01    PT/INR - ( 01 Sep 2018 07:48 )   PT: 11.6 sec;   INR: 1.04          PTT - ( 01 Sep 2018 07:48 )  PTT:29.7 sec  Urinalysis Basic - ( 01 Sep 2018 10:18 )    Color: Yellow / Appearance: Clear / SG: <=1.005 / pH: x  Gluc: x / Ketone: NEGATIVE  / Bili: Negative / Urobili: 0.2 E.U./dL   Blood: x / Protein: NEGATIVE mg/dL / Nitrite: NEGATIVE   Leuk Esterase: Large / RBC: 5-10 /HPF / WBC see note /HPF   Sq Epi: x / Non Sq Epi: 0-5 /HPF / Bacteria: Many /HPF      CAPILLARY BLOOD GLUCOSE      POCT Blood Glucose.: 112 mg/dL (02 Sep 2018 08:12)      RADIOLOGY & ADDITIONAL TESTS: Reviewed.

## 2018-09-03 LAB
CULTURE RESULTS: SIGNIFICANT CHANGE UP
SPECIMEN SOURCE: SIGNIFICANT CHANGE UP

## 2018-09-03 PROCEDURE — 99233 SBSQ HOSP IP/OBS HIGH 50: CPT

## 2018-09-03 PROCEDURE — 73030 X-RAY EXAM OF SHOULDER: CPT | Mod: 26,LT

## 2018-09-03 RX ORDER — SENNA PLUS 8.6 MG/1
2 TABLET ORAL AT BEDTIME
Qty: 0 | Refills: 0 | Status: DISCONTINUED | OUTPATIENT
Start: 2018-09-03 | End: 2018-09-12

## 2018-09-03 RX ORDER — QUETIAPINE FUMARATE 200 MG/1
25 TABLET, FILM COATED ORAL AT BEDTIME
Qty: 0 | Refills: 0 | Status: DISCONTINUED | OUTPATIENT
Start: 2018-09-03 | End: 2018-09-03

## 2018-09-03 RX ORDER — POLYETHYLENE GLYCOL 3350 17 G/17G
17 POWDER, FOR SOLUTION ORAL DAILY
Qty: 0 | Refills: 0 | Status: DISCONTINUED | OUTPATIENT
Start: 2018-09-03 | End: 2018-09-12

## 2018-09-03 RX ORDER — AMLODIPINE BESYLATE 2.5 MG/1
5 TABLET ORAL EVERY 24 HOURS
Qty: 0 | Refills: 0 | Status: DISCONTINUED | OUTPATIENT
Start: 2018-09-03 | End: 2018-09-09

## 2018-09-03 RX ORDER — QUETIAPINE FUMARATE 200 MG/1
25 TABLET, FILM COATED ORAL AT BEDTIME
Qty: 0 | Refills: 0 | Status: DISCONTINUED | OUTPATIENT
Start: 2018-09-03 | End: 2018-09-12

## 2018-09-03 RX ADMIN — Medication 650 MILLIGRAM(S): at 19:08

## 2018-09-03 RX ADMIN — MIRTAZAPINE 15 MILLIGRAM(S): 45 TABLET, ORALLY DISINTEGRATING ORAL at 14:32

## 2018-09-03 RX ADMIN — HEPARIN SODIUM 5000 UNIT(S): 5000 INJECTION INTRAVENOUS; SUBCUTANEOUS at 05:24

## 2018-09-03 RX ADMIN — Medication 25 MICROGRAM(S): at 05:24

## 2018-09-03 RX ADMIN — HEPARIN SODIUM 5000 UNIT(S): 5000 INJECTION INTRAVENOUS; SUBCUTANEOUS at 17:15

## 2018-09-03 RX ADMIN — PANTOPRAZOLE SODIUM 40 MILLIGRAM(S): 20 TABLET, DELAYED RELEASE ORAL at 05:24

## 2018-09-03 RX ADMIN — SENNA PLUS 2 TABLET(S): 8.6 TABLET ORAL at 22:40

## 2018-09-03 RX ADMIN — LISINOPRIL 40 MILLIGRAM(S): 2.5 TABLET ORAL at 14:32

## 2018-09-03 RX ADMIN — ESCITALOPRAM OXALATE 5 MILLIGRAM(S): 10 TABLET, FILM COATED ORAL at 14:32

## 2018-09-03 RX ADMIN — CEFTRIAXONE 100 GRAM(S): 500 INJECTION, POWDER, FOR SOLUTION INTRAMUSCULAR; INTRAVENOUS at 17:14

## 2018-09-03 RX ADMIN — Medication 10 MILLIGRAM(S): at 10:25

## 2018-09-03 RX ADMIN — Medication 650 MILLIGRAM(S): at 17:15

## 2018-09-03 RX ADMIN — AMLODIPINE BESYLATE 5 MILLIGRAM(S): 2.5 TABLET ORAL at 10:25

## 2018-09-03 NOTE — PROGRESS NOTE ADULT - SUBJECTIVE AND OBJECTIVE BOX
OVERNIGHT EVENTS: ASHLEY    SUBJECTIVE / INTERVAL HPI: Patient seen and examined at bedside. Patient complaining of right shoulder pain. Denied fever, chills, night sweats. Denied n/v/d/c, chest pain, SOB.    VITAL SIGNS:  Vital Signs Last 24 Hrs  T(C): 36.8 (03 Sep 2018 11:34), Max: 37.2 (02 Sep 2018 19:15)  T(F): 98.3 (03 Sep 2018 11:34), Max: 98.9 (02 Sep 2018 19:15)  HR: 59 (03 Sep 2018 11:34) (59 - 70)  BP: 121/76 (03 Sep 2018 11:34) (121/76 - 163/57)  BP(mean): --  RR: 15 (03 Sep 2018 11:34) (15 - 16)  SpO2: 94% (03 Sep 2018 11:34) (93% - 96%)    PHYSICAL EXAM:    General: NAD  HEENT: PERRL, anicteric sclera; MMM  Neck: supple  Cardiovascular: +S1/S2, RRR  Respiratory: CTA B/L; no W/R/R  Gastrointestinal: soft, NT/ND; +BSx4  Extremities: No erythema, no edema, no cyanosis b/l  MSK: Right shoulder and right arm with ecchymosis. Right shoulder tender to palpation.   Vascular: 2+ radial, 1+ posterior tibial pulses b/l  Neurological: AAOx2 (oriented to person and place, not time); no focal deficits    MEDICATIONS:  MEDICATIONS  (STANDING):  amLODIPine   Tablet 5 milliGRAM(s) Oral every 24 hours  cefTRIAXone   IVPB 1 Gram(s) IV Intermittent every 24 hours  escitalopram 5 milliGRAM(s) Oral daily  heparin  Injectable 5000 Unit(s) SubCutaneous every 12 hours  influenza   Vaccine 0.5 milliLiter(s) IntraMuscular once  levothyroxine 25 MICROGram(s) Oral daily  lisinopril 40 milliGRAM(s) Oral every 24 hours  mirtazapine 15 milliGRAM(s) Oral daily  pantoprazole    Tablet 40 milliGRAM(s) Oral before breakfast  polyethylene glycol 3350 17 Gram(s) Oral daily  senna 2 Tablet(s) Oral at bedtime    MEDICATIONS  (PRN):  acetaminophen   Tablet. 650 milliGRAM(s) Oral every 6 hours PRN Severe Pain (7 - 10)      ALLERGIES:  Allergies    No Known Allergies    Intolerances        LABS:                        11.9   9.9   )-----------( 241      ( 02 Sep 2018 07:45 )             35.7     09-02    136  |  100  |  25<H>  ----------------------------<  96  4.4   |  23  |  0.90    Ca    9.3      02 Sep 2018 07:45  Mg     2.1     09-02          CAPILLARY BLOOD GLUCOSE      POCT Blood Glucose.: 112 mg/dL (02 Sep 2018 08:12)      RADIOLOGY & ADDITIONAL TESTS: Reviewed.  < from: Xray Shoulder 2 Views, Right (09.01.18 @ 09:26) >  Impression: Fracture of the distal clavicle and separation of the   acromioclavicular joint.    < from: Xray Chest 1 View AP/PA (09.01.18 @ 09:27) >  IMPRESSION: No acute infiltrates.    < from: Xray Pelvis 2 views (09.01.18 @ 09:27) >    Impression: No acute pelvic fracture.

## 2018-09-03 NOTE — PROGRESS NOTE ADULT - PROBLEM SELECTOR PLAN 1
-Patient presents after fall at home, likely mechanical as patient as trying to get up out of bed.  Patient with dementia and poor historian and can't contribute to events prior to fall.  Home health aide heard patient's voice immediately after fall so less likely syncope.  No evidence of cardiac event, possibly vasovagal vs dehydration.  -Patient already received IVF and will encourage PO intake.  -EKG with no ischemic events  -CT head and spine without acute pathology  -Will follow up xrays of hip and shoulder  -Tylenol PRN for pain  -PT consult placed, appreciate recs -Patient presents after fall at home, likely mechanical as patient as trying to get up out of bed.  Patient with dementia and poor historian and can't contribute to events prior to fall.  Home health aide heard patient's voice immediately after fall so less likely syncope.  No evidence of cardiac event, possibly vasovagal vs dehydration.  -Patient already received IVF and will encourage PO intake.  -EKG with no ischemic events  -CT head and spine without acute pathology  -Xray right shoulder demonstrated distal clavicular fracture with separation of right AC joint.   -CXR and pelvic xray negative for fracture  -ortho consulted, recs appreciated   -f/u left shoulder xray  -placed in right arm sling  -ROM exercises in 2-4 weeks  -Tylenol PRN for pain  -PT consult placed, appreciate recs

## 2018-09-03 NOTE — PROGRESS NOTE ADULT - PROBLEM SELECTOR PLAN 2
-Patient with foul smelling, cloudy urine and + UA  - c/w ceftriaxone 1g daily -Patient with foul smelling, cloudy urine and + UA  - c/w ceftriaxone 1g daily for total of 5 days

## 2018-09-03 NOTE — CONSULT NOTE ADULT - SUBJECTIVE AND OBJECTIVE BOX
Orthopaedic Consult Note    Consult Requested by:   Surgeon:    CC:Patient is a 89y old  Female who presents with a chief complaint of Fall (01 Sep 2018 12:12)      HPI  89yFemale  c/o unwitnessed fall 2 days ago while in  her bedroom. Home health aid heard loud noise and then the patient calling for help. Found patient on floor bleeding from scalp. and subsequently developed ecchymoses and tenderness around R shoulder. Head CT negative in ED. Endorses shoulder pain.   Denies tingling, numbness, weakness in arms.   PAST MEDICAL & SURGICAL HISTORY:  Dementia  Essential hypertension  Hypothyroid    Allergies    No Known Allergies    Intolerances      MEDICATIONS  (STANDING):  amLODIPine   Tablet 5 milliGRAM(s) Oral every 24 hours  cefTRIAXone   IVPB 1 Gram(s) IV Intermittent every 24 hours  escitalopram 5 milliGRAM(s) Oral daily  heparin  Injectable 5000 Unit(s) SubCutaneous every 12 hours  influenza   Vaccine 0.5 milliLiter(s) IntraMuscular once  levothyroxine 25 MICROGram(s) Oral daily  lisinopril 40 milliGRAM(s) Oral every 24 hours  mirtazapine 15 milliGRAM(s) Oral daily  pantoprazole    Tablet 40 milliGRAM(s) Oral before breakfast  polyethylene glycol 3350 17 Gram(s) Oral daily  senna 2 Tablet(s) Oral at bedtime      Vital Signs Last 24 Hrs  T(C): 36.8 (03 Sep 2018 11:34), Max: 37.2 (02 Sep 2018 19:15)  T(F): 98.3 (03 Sep 2018 11:34), Max: 98.9 (02 Sep 2018 19:15)  HR: 59 (03 Sep 2018 11:34) (59 - 70)  BP: 121/76 (03 Sep 2018 11:34) (121/76 - 163/57)  BP(mean): --  RR: 15 (03 Sep 2018 11:34) (15 - 16)  SpO2: 94% (03 Sep 2018 11:34) (93% - 96%)    Physical Exam:  General: NAD, at baseline mental status per home health aid  Ecchymoses around shoulder, with tenderness to palpation over AC joint. No skin tenting, lacerations or poke holes. Several bruises distally most notably over lateral elbow and radial wrist however no tenderness to palpation elicited in these areas.   ROM: able to actively/passively flex to 100, abduction to 90, ext rotate to 30, int rotate to 45  Motor: intact wrist flexion, extension/, palmar intrinsics/bicep/tricep/delt  Sensation: SILT med/uln/rad/musc/axillary   Pulses:  rad/brach 2+ , fingers/hand WWP                              11.9   9.9   )-----------( 241      ( 02 Sep 2018 07:45 )             35.7     09-02    136  |  100  |  25<H>  ----------------------------<  96  4.4   |  23  |  0.90    Ca    9.3      02 Sep 2018 07:45  Mg     2.1     09-02        Imaging: R distal clavicular fracture, AC separation     A/P: 89yFemale w/ r distal 3rd clavicle fx  -ordered XR of contralateral shoulder   -immobilize arm in sling, begin gentle ROM exercises at 2-4 weeks  -OT  Discussed with chief/attending

## 2018-09-03 NOTE — PROGRESS NOTE ADULT - PROBLEM SELECTOR PLAN 7
-Patient with a history of depression, continue home doses of mirtazapine 15mg and escitalopram 5mg daily. Problem: Transition of care performed with sharing of clinical summary.  Plan: 1) PCP Contacted on Admission: (Y/N) --> Name & Phone #:  Dr Russ Alvarez [phone] or [phone]  2) Date of Contact with PCP:  3) PCP Contacted at Discharge: (Y/N, N/A)  4) Summary of Handoff Given to PCP:   5) Post-Discharge Appointment Date and Location:

## 2018-09-03 NOTE — PROGRESS NOTE ADULT - PROBLEM SELECTOR PLAN 8
Problem: Transition of care performed with sharing of clinical summary.  Plan: 1) PCP Contacted on Admission: (Y/N) --> Name & Phone #:  Dr Russ Alvarez [phone] or [phone]  2) Date of Contact with PCP:  3) PCP Contacted at Discharge: (Y/N, N/A)  4) Summary of Handoff Given to PCP:   5) Post-Discharge Appointment Date and Location: -F: No IVF indicated  -E: Will replete to K>4 and Mg>2  -N: DASH/TLC diet    FULL CODE

## 2018-09-03 NOTE — PROGRESS NOTE ADULT - PROBLEM SELECTOR PLAN 5
-Patient with a history of hypothyroidism, will continue home NP thyroid 30mg daily. -Patient with a history of hypothyroidism  -c/w synthroid 25mcg

## 2018-09-03 NOTE — PROGRESS NOTE ADULT - PROBLEM SELECTOR PLAN 9
-No IVF indicated  -Will replete to K>4 and Mg>2  -DASH/TLC diet  -Dispo RMF  -Full Code until we can establish wishes of patient -HSQ  -Esomeprazole 40mg daily

## 2018-09-03 NOTE — PROGRESS NOTE ADULT - PROBLEM SELECTOR PLAN 4
-Patient with a history of GERD, will continue home esomeprazole 40mg daily. -Patient with a history of GERD,   -c/w esomeprazole 40mg daily.

## 2018-09-03 NOTE — PROGRESS NOTE ADULT - PROBLEM SELECTOR PLAN 3
-Patient with a history of hypertension however hypotensive on admission, takes Lisinopril 40mg and amlodipine 2.5mg at home, will hold for now and restart as BP tolerates. -Patient with a history of hypertension however hypotensive on admission, takes Lisinopril 40mg and amlodipine 2.5mg at home,   -c/w lisinopril 40mg   -increased amlodipine to 5mg daily in setting of elevated BP

## 2018-09-03 NOTE — PROGRESS NOTE ADULT - PROBLEM SELECTOR PLAN 6
BP elevated to 180s this AM  - restarted home amlodipine 2.5qD, will consider restarting lisinopril 10mg qD -Patient with a history of depression  -c/w mirtazapine 15mg and escitalopram 5mg daily.

## 2018-09-04 ENCOUNTER — TRANSCRIPTION ENCOUNTER (OUTPATIENT)
Age: 83
End: 2018-09-04

## 2018-09-04 DIAGNOSIS — N18.3 CHRONIC KIDNEY DISEASE, STAGE 3 (MODERATE): ICD-10-CM

## 2018-09-04 LAB
APPEARANCE UR: ABNORMAL
BILIRUB UR-MCNC: NEGATIVE — SIGNIFICANT CHANGE UP
COLOR SPEC: YELLOW — SIGNIFICANT CHANGE UP
DIFF PNL FLD: ABNORMAL
GLUCOSE UR QL: NEGATIVE — SIGNIFICANT CHANGE UP
KETONES UR-MCNC: ABNORMAL MG/DL
LEUKOCYTE ESTERASE UR-ACNC: ABNORMAL
NITRITE UR-MCNC: NEGATIVE — SIGNIFICANT CHANGE UP
PH UR: 6 — SIGNIFICANT CHANGE UP (ref 5–8)
PROT UR-MCNC: 100 MG/DL
SP GR SPEC: 1.02 — SIGNIFICANT CHANGE UP (ref 1–1.03)
UROBILINOGEN FLD QL: 0.2 E.U./DL — SIGNIFICANT CHANGE UP

## 2018-09-04 PROCEDURE — 99233 SBSQ HOSP IP/OBS HIGH 50: CPT | Mod: GC

## 2018-09-04 RX ORDER — IBUPROFEN 200 MG
400 TABLET ORAL ONCE
Qty: 0 | Refills: 0 | Status: COMPLETED | OUTPATIENT
Start: 2018-09-04 | End: 2018-09-04

## 2018-09-04 RX ADMIN — AMLODIPINE BESYLATE 5 MILLIGRAM(S): 2.5 TABLET ORAL at 09:43

## 2018-09-04 RX ADMIN — POLYETHYLENE GLYCOL 3350 17 GRAM(S): 17 POWDER, FOR SOLUTION ORAL at 12:23

## 2018-09-04 RX ADMIN — LISINOPRIL 40 MILLIGRAM(S): 2.5 TABLET ORAL at 13:56

## 2018-09-04 RX ADMIN — HEPARIN SODIUM 5000 UNIT(S): 5000 INJECTION INTRAVENOUS; SUBCUTANEOUS at 07:05

## 2018-09-04 RX ADMIN — SENNA PLUS 2 TABLET(S): 8.6 TABLET ORAL at 22:28

## 2018-09-04 RX ADMIN — MIRTAZAPINE 15 MILLIGRAM(S): 45 TABLET, ORALLY DISINTEGRATING ORAL at 12:23

## 2018-09-04 RX ADMIN — HEPARIN SODIUM 5000 UNIT(S): 5000 INJECTION INTRAVENOUS; SUBCUTANEOUS at 17:47

## 2018-09-04 RX ADMIN — Medication 650 MILLIGRAM(S): at 09:43

## 2018-09-04 RX ADMIN — PANTOPRAZOLE SODIUM 40 MILLIGRAM(S): 20 TABLET, DELAYED RELEASE ORAL at 07:06

## 2018-09-04 RX ADMIN — Medication 650 MILLIGRAM(S): at 10:15

## 2018-09-04 RX ADMIN — ESCITALOPRAM OXALATE 5 MILLIGRAM(S): 10 TABLET, FILM COATED ORAL at 12:23

## 2018-09-04 RX ADMIN — Medication 25 MICROGRAM(S): at 07:06

## 2018-09-04 RX ADMIN — Medication 400 MILLIGRAM(S): at 14:15

## 2018-09-04 RX ADMIN — Medication 400 MILLIGRAM(S): at 13:57

## 2018-09-04 NOTE — DISCHARGE NOTE ADULT - PLAN OF CARE
Decreasing risk of fall You were admitted to the hospital because you fell and broke your clavicle. To treat it we put your arm in a sling. It's important that you take your time in moving around and use of assistive equipment if you need it to prevent falls. Hygiene While you were in the hospital it was found that had an infection in your urinary tract. You were treated with 3 days of antibiotics and your infection cleared. It is important to practice good hygiene to prevent recurrence because these infections can lead to more serious blood infections. Spontaneous Urination During your hospital stay, you retained urine in your bladder. We performed a Trial of Void to see if you would void spontaneously, after the trial you were still retaining urine. So we inserted a Shankar catheter to make sure you were voiding.

## 2018-09-04 NOTE — PROGRESS NOTE ADULT - PROBLEM SELECTOR PLAN 8
Problem: Transition of care performed with sharing of clinical summary.  Plan: 1) PCP Contacted on Admission: (Y/N) --> Name & Phone #:  Dr Russ Alvarez [phone] or [phone]  2) Date of Contact with PCP:  3) PCP Contacted at Discharge: (Y/N, N/A)  4) Summary of Handoff Given to PCP:   5) Post-Discharge Appointment Date and Location:

## 2018-09-04 NOTE — CONSULT NOTE ADULT - SUBJECTIVE AND OBJECTIVE BOX
Patient is a 89y old  Female who presents with a chief complaint of Fall (01 Sep 2018 12:12)       HPI:  89 year old female with PMH OA, questionable CKD, HTN, GERD, depression, dementia, and hypothyroidism who presents after a fall.  Patient's home health aide was in the bathroom and she heard a loud sound in the bedroom.  Immediately after that the aide heard the patient crying out for help and she found her at the foot of the bed bleeding from her scalp.  In the ED, vital signs were BP 97/68, HR 52, RR 16, temp 97.5 F and saturating 95% on room air. Patient's labs were unremarkable.  CT head without acute pathology.  She was given IV fluids and tylenol for pain. (01 Sep 2018 12:12)      PAST MEDICAL & SURGICAL HISTORY:  Dementia  Essential hypertension  Hypothyroid      MEDICATIONS  (STANDING):  amLODIPine   Tablet 5 milliGRAM(s) Oral every 24 hours  cefTRIAXone   IVPB 1 Gram(s) IV Intermittent every 24 hours  escitalopram 5 milliGRAM(s) Oral daily  heparin  Injectable 5000 Unit(s) SubCutaneous every 12 hours  influenza   Vaccine 0.5 milliLiter(s) IntraMuscular once  levothyroxine 25 MICROGram(s) Oral daily  lisinopril 40 milliGRAM(s) Oral every 24 hours  mirtazapine 15 milliGRAM(s) Oral daily  pantoprazole    Tablet 40 milliGRAM(s) Oral before breakfast  polyethylene glycol 3350 17 Gram(s) Oral daily  senna 2 Tablet(s) Oral at bedtime    MEDICATIONS  (PRN):  acetaminophen   Tablet. 650 milliGRAM(s) Oral every 6 hours PRN Severe Pain (7 - 10)  QUEtiapine 25 milliGRAM(s) Oral at bedtime PRN Agitation      Social History per patient: lives alone in an elevator accessible apartment building, has 24 hr x 7 day home care services    Functional Level Prior to Admission per patient: partial assist with bathing/ toileting, walks with a walker/supervision    FAMILY HISTORY:      Radiology:    < from: CT Head No Cont (09.01.18 @ 09:47) >  EXAM:  CT BRAIN                          PROCEDURE DATE:  09/01/2018          INTERPRETATION:  Axial images were obtained from the skull base to the   cranial vertex without intravenous contrast.  Soft tissue and bone   algorithm images were evaluated.    Clinical information: Status post fall with head injury.    The current study is compared with previous scan dated 12/24/2015.    Diffuse cerebral volume loss has progressed minimally since the prior   study.  There is no evidence of intracranial hemorrhage, mass or acute   infarction. There are minor changes of chronic ischemic microangiopathy   within the cerebral white matter. There is no lesion or fracture of the   skull or skull base. Large right parietal scalp hematoma is noted. The   visualized paranasal sinuses are clear. There is extensive opacification   of left-sided mastoid air cells, new since the prior examination. There   is no large nasopharyngeal mass.    IMPRESSION:    No acute intracranial hemorrhage. New opacification of left-sided mastoid   air cells.        < from: Xray Shoulder 2 Views, Right (09.01.18 @ 09:26) >  EXAM:  XR SHOULDER-RIGHT MIN 2 VIEWS                          PROCEDURE DATE:  09/01/2018          INTERPRETATION:  X-ray of the right shoulder    Indication: Fall    3 views of the right shoulder demonstrate advanced degenerative changes   with evidence of chronic rotator cuff injury. There is an angulated   fracture of the distal clavicle and separation of the acromioclavicular   joint.    Impression: Fracture of the distal clavicle and separation of the   acromioclavicular joint.        < from: Xray Pelvis 2 views (09.01.18 @ 09:27) >  EXAM:  XR PELVIS-1 OR 2 VIEWS                          PROCEDURE DATE:  09/01/2018          INTERPRETATION:  X-ray of the pelvis    Indication: Fall    A single frontal view of the pelvis is compared to the study of   12/24/2015. No acute fracture is identified.    Impression: No acute pelvic fracture.            Vital Signs Last 24 Hrs  T(C): 36.7 (04 Sep 2018 06:00), Max: 36.8 (03 Sep 2018 11:34)  T(F): 98 (04 Sep 2018 06:00), Max: 98.3 (03 Sep 2018 11:34)  HR: 51 (04 Sep 2018 06:00) (51 - 65)  BP: 147/72 (04 Sep 2018 06:00) (121/76 - 147/72)  BP(mean): --  RR: 16 (04 Sep 2018 06:00) (15 - 16)  SpO2: 97% (04 Sep 2018 06:00) (94% - 98%)    REVIEW OF SYSTEMS:    CONSTITUTIONAL: No fever, weight loss, or fatigue  EYES: No eye pain, visual disturbances, or discharge  ENMT:  No difficulty hearing, tinnitus, vertigo; No sinus or throat pain  NECK: No pain or stiffness  BREASTS: No pain, masses, or nipple discharge  RESPIRATORY: No cough, wheezing, chills or hemoptysis; No shortness of breath  CARDIOVASCULAR: No chest pain, palpitations, dizziness, or leg swelling  GASTROINTESTINAL: No abdominal or epigastric pain. No nausea, vomiting, or hematemesis; No diarrhea or constipation. No melena or hematochezia.  GENITOURINARY: No dysuria, frequency, hematuria, or incontinence  NEUROLOGICAL: No headaches, memory loss, loss of strength, numbness, or tremors  SKIN: No itching, burning, rashes, or lesions   LYMPH NODES: No enlarged glands  ENDOCRINE: No heat or cold intolerance; No hair loss  MUSCULOSKELETAL: right shoulder pain  PSYCHIATRIC: No depression, anxiety, mood swings, or difficulty sleeping  HEME/LYMPH: No easy bruising, or bleeding gums  ALLERGY AND IMMUNOLOGIC: No hives or eczema  VASCULAR: no swelling, erythema      Physical Exam: faril 89 yo  woman lying in semi Mao's position, c/o right shoulder pain    Head: normocephalic, right parietal hematoma    Eyes: PERRLA, EOMI, no nystagmus, sclera anicteric    ENT: nasal discharge, uvula midline, no oropharyngeal erythema/exudate    Neck: supple, negative JVD, negative carotid bruits, no thyromegaly    Chest: CTA bilaterally, neg wheeze, rhonchi, rales, crackles, egophany    Cardiovascular: regular rate and rhythm, neg murmurs/rubs/gallops    Abdomen: soft, non distended, non tender, negative rebound/guarding, normal bowel sounds, neg hepatosplenomegaly    Extremities: WWP, neg cyanosis/clubbing/edema, negative calf tenderness to palpation, negative Kenyatta's sign    Right shoulder: limited range of motion secondary to pain/ guarding, pos bruise TTP,     :     Neurologic Exam:    Alert and oriented x 1 to person, follows commands    Cranial Nerves:     II:                       pupils equal, round and reactive to light, visual fields intact   III/ IV/VI:            extraocular movements intact, neg nystagmus, ptosis  V:                       facial sensation intact, V1-3 normal  VII:                     face symmetric, no droop, normal eye closure and smile  VIII:                    hearing intact to finger rub bilaterally  IX/ X:                 soft palate rise symmetrical  XI:                      head turning, shoulder shrug normal  XII:                     tongue midline    Motor Exam:    Upper Extremities:              Right:    4/5 /intrinsics                                                       deltoid not tested secondary to pain                                                          4+/5 biceps                                                          4/5 triceps                                                          5/5 wrist extensors-flexors                                                          negative pronator drift                                                Left:      4/5 /intrinsics                                                          4/5 deltoid                                                          4+/5 biceps                                                          5/5 triceps                                                          5/5 wrist extensors/flexors                                                          negative pronator drift      Lower Extremities:            Right:      4/5 hip flexors/adductors/abductors                                                           5/5 quadriceps/hamstrings                                                           5/5 dorsiflexors/plantar flexors/invertors-evertors                                               Left:       4/5 hip flexors/adductors/abductors                                                            5/5 quadriceps/hamstrings                                                            5/5 dorsiflexors/plantar flexors/invertors-evertors    Sensory:              intact to LT/PP in all UE/LE dermatomes    DTR:                   = biceps/     triceps/     brachioradialis                            = patella/   medial hamstring/    ankle                            neg clonus                            neg Babinski                            neg Hoffmans      Romberg:  not tested    Tandem Walking:  not tested    Gait:  not tested        PM&R Impression:      1) s/p mechanical fall  2) Acute right distal 1/3 clavicle fracture/ no weight bearing right UE  3) deconditioned  4) gait dysfunction    Recommendations:    1) Physical therapy focusing on therapeutic exercises, bed mobility/transfer out of bed evaluation, progressive ambulation with assistive devices.    2) Anticipated Disposition Plan/Recommendations: d/c home with home physical /occupational therapy, resume 24 hr x 7 day home care services

## 2018-09-04 NOTE — DISCHARGE NOTE ADULT - PROVIDER TOKENS
TOKSURJIT:'[id number]' TOKEN:'[id number]',FREE:[LAST:[House Call],PHONE:[(   )    -],FAX:[(   )    -],ADDRESS:[A primary care physician will come to your house to see you and check up on you. The phone number is [phone]]] TOKEN:'[id number]',FREE:[LAST:[House Call],PHONE:[(   )    -],FAX:[(   )    -],ADDRESS:[A primary care physician will come to your house to see you and check up on you. The phone number is [phone]]],TOKEN:'[id number]'

## 2018-09-04 NOTE — PROGRESS NOTE ADULT - PROBLEM SELECTOR PLAN 2
-Patient with foul smelling, cloudy urine and + UA  - c/w ceftriaxone 1g daily for total of 5 days -Patient with foul smelling, cloudy urine and + UA  -s/p ceftriaxone for a total of 3 days, completed therapy today

## 2018-09-04 NOTE — PROGRESS NOTE ADULT - SUBJECTIVE AND OBJECTIVE BOX
OVERNIGHT EVENTS: Ortho evaluated and placed arm in sling for distal clavicular fracture    SUBJECTIVE / INTERVAL HPI: Patient seen and examined at bedside.     VITAL SIGNS:  Vital Signs Last 24 Hrs  T(C): 36.7 (04 Sep 2018 06:00), Max: 36.7 (03 Sep 2018 22:00)  T(F): 98 (04 Sep 2018 06:00), Max: 98 (03 Sep 2018 22:00)  HR: 51 (04 Sep 2018 06:00) (51 - 65)  BP: 147/72 (04 Sep 2018 06:00) (125/65 - 147/72)  BP(mean): --  RR: 16 (04 Sep 2018 06:00) (16 - 16)  SpO2: 97% (04 Sep 2018 06:00) (97% - 98%)    PHYSICAL EXAM:    General: WDWN  HEENT: NC/AT; PERRL, anicteric sclera; MMM  Neck: supple  Cardiovascular: +S1/S2, RRR  Respiratory: CTA B/L; no W/R/R  Gastrointestinal: soft, NT/ND; +BSx4  Extremities: WWP; no edema, clubbing or cyanosis  Vascular: 2+ radial, DP/PT pulses B/L  Neurological: AAOx3; no focal deficits    MEDICATIONS:  MEDICATIONS  (STANDING):  amLODIPine   Tablet 5 milliGRAM(s) Oral every 24 hours  escitalopram 5 milliGRAM(s) Oral daily  heparin  Injectable 5000 Unit(s) SubCutaneous every 12 hours  influenza   Vaccine 0.5 milliLiter(s) IntraMuscular once  levothyroxine 25 MICROGram(s) Oral daily  lisinopril 40 milliGRAM(s) Oral every 24 hours  mirtazapine 15 milliGRAM(s) Oral daily  pantoprazole    Tablet 40 milliGRAM(s) Oral before breakfast  polyethylene glycol 3350 17 Gram(s) Oral daily  senna 2 Tablet(s) Oral at bedtime    MEDICATIONS  (PRN):  acetaminophen   Tablet. 650 milliGRAM(s) Oral every 6 hours PRN Severe Pain (7 - 10)  QUEtiapine 25 milliGRAM(s) Oral at bedtime PRN Agitation      ALLERGIES:  Allergies    No Known Allergies    Intolerances        LABS:              CAPILLARY BLOOD GLUCOSE          RADIOLOGY & ADDITIONAL TESTS: Reviewed. OVERNIGHT EVENTS: Ortho evaluated and placed arm in sling for distal clavicular fracture    SUBJECTIVE / INTERVAL HPI: Patient seen and examined at bedside. Patient denied any significant symptoms overnight. She denied     VITAL SIGNS:  Vital Signs Last 24 Hrs  T(C): 36.7 (04 Sep 2018 06:00), Max: 36.7 (03 Sep 2018 22:00)  T(F): 98 (04 Sep 2018 06:00), Max: 98 (03 Sep 2018 22:00)  HR: 51 (04 Sep 2018 06:00) (51 - 65)  BP: 147/72 (04 Sep 2018 06:00) (125/65 - 147/72)  BP(mean): --  RR: 16 (04 Sep 2018 06:00) (16 - 16)  SpO2: 97% (04 Sep 2018 06:00) (97% - 98%)    PHYSICAL EXAM:    General: WDWN  HEENT: NC/AT; PERRL, anicteric sclera; MMM  Neck: supple  Cardiovascular: +S1/S2, RRR  Respiratory: CTA B/L; no W/R/R  Gastrointestinal: soft, NT/ND; +BSx4  Extremities: WWP; no edema, clubbing or cyanosis  Vascular: 2+ radial, DP/PT pulses B/L  Neurological: AAOx3; no focal deficits    MEDICATIONS:  MEDICATIONS  (STANDING):  amLODIPine   Tablet 5 milliGRAM(s) Oral every 24 hours  escitalopram 5 milliGRAM(s) Oral daily  heparin  Injectable 5000 Unit(s) SubCutaneous every 12 hours  influenza   Vaccine 0.5 milliLiter(s) IntraMuscular once  levothyroxine 25 MICROGram(s) Oral daily  lisinopril 40 milliGRAM(s) Oral every 24 hours  mirtazapine 15 milliGRAM(s) Oral daily  pantoprazole    Tablet 40 milliGRAM(s) Oral before breakfast  polyethylene glycol 3350 17 Gram(s) Oral daily  senna 2 Tablet(s) Oral at bedtime    MEDICATIONS  (PRN):  acetaminophen   Tablet. 650 milliGRAM(s) Oral every 6 hours PRN Severe Pain (7 - 10)  QUEtiapine 25 milliGRAM(s) Oral at bedtime PRN Agitation      ALLERGIES:  Allergies    No Known Allergies    Intolerances        LABS:              CAPILLARY BLOOD GLUCOSE          RADIOLOGY & ADDITIONAL TESTS: Reviewed. OVERNIGHT EVENTS: Ortho evaluated and placed arm in sling for distal clavicular fracture    SUBJECTIVE / INTERVAL HPI: Patient seen and examined at bedside. Patient denied any significant symptoms overnight. She denied any complaints this morning, stated that she did not want me to leave.     VITAL SIGNS:  Vital Signs Last 24 Hrs  T(C): 36.7 (04 Sep 2018 06:00), Max: 36.7 (03 Sep 2018 22:00)  T(F): 98 (04 Sep 2018 06:00), Max: 98 (03 Sep 2018 22:00)  HR: 51 (04 Sep 2018 06:00) (51 - 65)  BP: 147/72 (04 Sep 2018 06:00) (125/65 - 147/72)  BP(mean): --  RR: 16 (04 Sep 2018 06:00) (16 - 16)  SpO2: 97% (04 Sep 2018 06:00) (97% - 98%)    PHYSICAL EXAM:    General: NAD  HEENT: PERRL, anicteric sclera; MMM  Neck: supple  Cardiovascular: +S1/S2, RRR  Respiratory: CTA B/L; no W/R/R  Gastrointestinal: soft, NT/ND; +BSx4  Extremities: No erythema, no edema, no cyanosis b/l  MSK: Right shoulder and right arm with ecchymosis. Right shoulder tender to palpation.   Vascular: 2+ radial, 1+ posterior tibial pulses b/l  Neurological: AAOx2 (oriented to person and place, not time); no focal deficits    MEDICATIONS:  MEDICATIONS  (STANDING):  amLODIPine   Tablet 5 milliGRAM(s) Oral every 24 hours  escitalopram 5 milliGRAM(s) Oral daily  heparin  Injectable 5000 Unit(s) SubCutaneous every 12 hours  influenza   Vaccine 0.5 milliLiter(s) IntraMuscular once  levothyroxine 25 MICROGram(s) Oral daily  lisinopril 40 milliGRAM(s) Oral every 24 hours  mirtazapine 15 milliGRAM(s) Oral daily  pantoprazole    Tablet 40 milliGRAM(s) Oral before breakfast  polyethylene glycol 3350 17 Gram(s) Oral daily  senna 2 Tablet(s) Oral at bedtime    MEDICATIONS  (PRN):  acetaminophen   Tablet. 650 milliGRAM(s) Oral every 6 hours PRN Severe Pain (7 - 10)  QUEtiapine 25 milliGRAM(s) Oral at bedtime PRN Agitation      ALLERGIES:  Allergies    No Known Allergies    Intolerances        LABS:              CAPILLARY BLOOD GLUCOSE          RADIOLOGY & ADDITIONAL TESTS: Reviewed.

## 2018-09-04 NOTE — DISCHARGE NOTE ADULT - ADDITIONAL INSTRUCTIONS
At rehab you will be set up with further instructions for follow up Follow up with Dr. Manrique on 9/19 at 2:15pm Follow up with Dr. Manrique on 9/19 at 2:15pm  House call with set up appt at home [phone] Follow up with Dr. Manrique on 9/19 at 2:15pm  House call will follow up with you at home on 9/14 between 9am-3pm Follow up with Dr. Manrique on 9/19 at 2:15pm  House call will follow up with you at home on 9/14 between 9am-3pm  Follow up with Dr. Robertson on Tuesday on 9/18 at 1:30pm

## 2018-09-04 NOTE — PHYSICAL THERAPY INITIAL EVALUATION ADULT - ADDITIONAL COMMENTS
Patient is poor historian, information obtained via chart. Patient lives in low-income housing with 24/7 home health assistance. Patient is poor historian, information obtained via chart. Patient lives in low-income housing with 24/7 home health assistance. Patient is R handed.

## 2018-09-04 NOTE — DISCHARGE NOTE ADULT - PATIENT PORTAL LINK FT
You can access the [facility] Patient Portal, offered by [facility], by registering with the following website: [email or web address]

## 2018-09-04 NOTE — DISCHARGE NOTE ADULT - CARE PROVIDER_API CALL
Sacha Manrique), Orthopaedic Surgery Surgery  [address] [place] [address]  Phone: [phone]  Fax: [phone] Sacha Manrique), Orthopaedic Surgery Surgery  [address] [address]  Phone: [phone]  Fax: [phone]    House Call,   A primary care physician will come to your house to see you and check up on you. The phone number is [phone]  Phone: (   )    -  Fax: (   )    - Sacha Manrique), Orthopaedic Surgery Surgery  [address] [address]  Phone: [phone]  Fax: [phone]    House Call,   A primary care physician will come to your house to see you and check up on you. The phone number is [phone]  Phone: (   )    -  Fax: (   )    -    Nic Robertson), Urology  [address] [place] [id number]  Phone: [phone]  Fax: [phone]

## 2018-09-04 NOTE — CONSULT NOTE ADULT - ASSESSMENT
89 year old female with PMH OA, questionable CKD, HTN, GERD, depression, dementia, and hypothyroidism who presents after a fall.     Problem/Plan - 1:  ·  Problem: Fall.  Plan: -Patient presents after fall at home, likely mechanical as patient as trying to get up out of bed.  Patient with dementia and poor historian and can't contribute to events prior to fall.  Home health aide heard patient's voice immediately after fall so less likely syncope.  No evidence of cardiac event, possibly vasovagal vs dehydration.  -Patient already received IVF and will encourage PO intake.  -EKG with no ischemic events  -CT head and spine without acute pathology  -Xray right shoulder demonstrated distal clavicular fracture with separation of right AC joint.   -CXR and pelvic xray negative for fracture  -ortho consulted, recs appreciated   -f/u left shoulder xray  -placed in right arm sling  -ROM exercises in 2-4 weeks  -Tylenol PRN for pain    Problem/Plan - 2:  ·  Problem: UTI (urinary tract infection).  Plan: -Patient with foul smelling, cloudy urine and + UA  - c/w ceftriaxone 1g daily for total of 5 days.

## 2018-09-04 NOTE — PHYSICAL THERAPY INITIAL EVALUATION ADULT - PERTINENT HX OF CURRENT PROBLEM, REHAB EVAL
89 year old female with PMH OA, questionable CKD, HTN, GERD, depression, dementia, and hypothyroidism who presents after a fall.  Patient's home health aide was in the bathroom and she heard a loud sound in the bedroom.  Immediately after that the aide heard the patient crying out for help and she found her at the foot of the bed bleeding from her scalp. Patient found to have R 3rd degree distal clavicle fracture.

## 2018-09-04 NOTE — DISCHARGE NOTE ADULT - CARE PROVIDERS DIRECT ADDRESSES
,[email or web address]'s [email or web address],DirectAddress_Unknown ,[email or web address],DirectAddress_Unknown,DirectAddress_Unknown

## 2018-09-04 NOTE — DISCHARGE NOTE ADULT - HOSPITAL COURSE
89 year old female with PMH OA, questionable CKD, HTN, GERD, depression, dementia, and hypothyroidism who presents after a fall.  Patient's home health aide was in the bathroom and she heard a loud sound in the bedroom.  Immediately after that the aide heard the patient crying out for help and she found her at the foot of the bed bleeding from her scalp.  In the ED, vital signs were BP 97/68, HR 52, RR 16, temp 97.5 F and saturating 95% on room air. Patient's labs were unremarkable.  CT head without acute pathology.  She was given IV fluids and tylenol for pain.    She was admitted to [facility] for further workup. At night, she had difficulties with agitation and sundowning. She was given seroquel 25mg PRN with good effect. On further workup, she was found to have a UA indicating likely UTI, however the urine sample was contaminated and could not be cultured. She was treated empirically with ceftriaxone for a total of 3 days. Additionally, she was found to have a an Xray right shoulder demonstrating distal clavicular fracture with separation of right AC joint. Her L shoulder had a chronic rotator cuff tear with no acute fracture. Her pelvis was also negative for fracture. Ortho was consulted for the clavicular fracture for which they placed her arm in a sling and recommended OT with ROM exercises in 2-4 weeks. OT was consulted as well, recommending ########. PT recommended CHELI placement for her rehabilitation after her fall. At the time of discharge, she was hemodynamically stable and #######. She will follow up with  #####. 89 year old female with PMH OA, questionable CKD, HTN, GERD, depression, dementia, and hypothyroidism who presents after a fall.  Patient's home health aide was in the bathroom and she heard a loud sound in the bedroom.  Immediately after that the aide heard the patient crying out for help and she found her at the foot of the bed bleeding from her scalp.  In the ED, vital signs were BP 97/68, HR 52, RR 16, temp 97.5 F and saturating 95% on room air. Patient's labs were unremarkable.  CT head without acute pathology.  She was given IV fluids and tylenol for pain.    She was admitted to [facility] for further workup. At night, she had difficulties with agitation and sundowning. She was given seroquel 25mg PRN with good effect. On further workup, she was found to have a UA indicating likely UTI, however the urine sample was contaminated and could not be cultured. She was treated empirically with ceftriaxone for a total of 3 days. Additionally, she was found to have a an Xray right shoulder demonstrating distal clavicular fracture with separation of right AC joint. Her L shoulder had a chronic rotator cuff tear with no acute fracture. Her pelvis was also negative for fracture. Ortho was consulted for the clavicular fracture for which they placed her arm in a sling and recommended OT with ROM exercises in 2-4 weeks PT recommended CHELI placement for her rehabilitation after her fall. At the time of discharge, she was hemodynamically stable. She will follow up with  #####. 89 year old female with PMH OA, questionable CKD, HTN, GERD, depression, dementia, and hypothyroidism who presents after a fall.  Patient's home health aide was in the bathroom and she heard a loud sound in the bedroom.  Immediately after that the aide heard the patient crying out for help and she found her at the foot of the bed bleeding from her scalp.  In the ED, vital signs were BP 97/68, HR 52, RR 16, temp 97.5 F and saturating 95% on room air. Patient's labs were unremarkable.  CT head without acute pathology.  She was given IV fluids and tylenol for pain.    She was admitted to [facility] for further workup. At night, she had difficulties with agitation and sundowning. She was given seroquel 25mg PRN with good effect. On further workup, she was found to have a UA indicating likely UTI, however the urine sample was contaminated and could not be cultured. She was treated empirically with ceftriaxone for a total of 3 days. Additionally, she was found to have a an Xray right shoulder demonstrating distal clavicular fracture with separation of right AC joint. Her L shoulder had a chronic rotator cuff tear with no acute fracture. Her pelvis was also negative for fracture. Ortho was consulted for the clavicular fracture for which they placed her arm in a sling and recommended OT with ROM exercises in 2-4 weeks PT recommended CHELI placement for her rehabilitation after her fall. At the time of discharge, she was hemodynamically stable. Patient was found to be retaining urine a Shankar catheter was inserted. Trial of void was performed and failed 2 bladder scans and Shankar was re-inserted, patient will be discharged to rehab with Shankar. 89 year old female with PMH OA, questionable CKD, HTN, GERD, depression, dementia, and hypothyroidism who presents after a fall.  Patient's home health aide was in the bathroom and she heard a loud sound in the bedroom.  Immediately after that the aide heard the patient crying out for help and she found her at the foot of the bed bleeding from her scalp.  In the ED, vital signs were BP 97/68, HR 52, RR 16, temp 97.5 F and saturating 95% on room air. Patient's labs were unremarkable.  CT head without acute pathology.  She was given IV fluids and tylenol for pain.    She was admitted to [facility] for further workup. At night, she had difficulties with agitation and sundowning. She was given seroquel 25mg PRN with good effect. On further workup, she was found to have a UA indicating likely UTI, however the urine sample was contaminated and could not be cultured. She was treated empirically with ceftriaxone for a total of 3 days. Additionally, she was found to have a an Xray right shoulder demonstrating distal clavicular fracture with separation of right AC joint. Her L shoulder had a chronic rotator cuff tear with no acute fracture. Her pelvis was also negative for fracture. Ortho was consulted for the clavicular fracture for which they placed her arm in a sling and recommended OT with ROM exercises in 2-4 weeks PT recommended CHELI placement for her rehabilitation after her fall. At the time of discharge, she was hemodynamically stable. Patient was found to be retaining urine a Shankar catheter was inserted. Trial of void was performed and failed 2 bladder scans and Shankar was re-inserted, patient will be discharged to home with Shankar. 89 year old female with PMH OA, questionable CKD, HTN, GERD, depression, dementia, and hypothyroidism who presents after a fall.  Patient's home health aide was in the bathroom and she heard a loud sound in the bedroom.  Immediately after that the aide heard the patient crying out for help and she found her at the foot of the bed bleeding from her scalp.  In the ED, vital signs were BP 97/68, HR 52, RR 16, temp 97.5 F and saturating 95% on room air. Patient's labs were unremarkable.  CT head without acute pathology.  She was given IV fluids and tylenol for pain.    She was admitted to [facility] for further workup. At night, she had difficulties with agitation and sundowning. She was given seroquel 25mg PRN with good effect. On further workup, she was found to have a UA indicating likely UTI, however the urine sample was contaminated and could not be cultured. She was treated empirically with ceftriaxone for a total of 3 days. Additionally, she was found to have a an Xray right shoulder demonstrating distal clavicular fracture with separation of right AC joint. Her L shoulder had a chronic rotator cuff tear with no acute fracture. Her pelvis was also negative for fracture. Ortho was consulted for the clavicular fracture for which they placed her arm in a sling and recommended OT with ROM exercises in 2-4 weeks PT recommended CHELI placement for her rehabilitation after her fall. At the time of discharge, she was hemodynamically stable. Patient was found to be retaining urine a Shankar catheter was inserted. Trial of void was performed and failed 2 bladder scans and Shankar was re-inserted, patient will be discharged to home with Shankar. Shankar need to be removed in one week. 89 year old female with PMH OA, questionable CKD, HTN, GERD, depression, dementia, and hypothyroidism who presents after a fall.  Patient's home health aide was in the bathroom and she heard a loud sound in the bedroom.  Immediately after that the aide heard the patient crying out for help and she found her at the foot of the bed bleeding from her scalp.  In the ED, vital signs were BP 97/68, HR 52, RR 16, temp 97.5 F and saturating 95% on room air. Patient's labs were unremarkable.  CT head without acute pathology.  She was given IV fluids and tylenol for pain.    She was admitted to [facility] for further workup. At night, she had difficulties with agitation and sundowning. She was given seroquel 25mg PRN with good effect. On further workup, she was found to have a UA indicating likely UTI, however the urine sample was contaminated and could not be cultured. She was treated empirically with ceftriaxone for a total of 3 days. Additionally, she was found to have a an Xray right shoulder demonstrating distal clavicular fracture with separation of right AC joint. Her L shoulder had a chronic rotator cuff tear with no acute fracture. Her pelvis was also negative for fracture. Ortho was consulted for the clavicular fracture for which they placed her arm in a sling and recommended OT with ROM exercises in 2-4 weeks PT recommended CHELI placement for her rehabilitation after her fall. At the time of discharge, she was hemodynamically stable. Patient was found to be retaining urine a Shankar catheter was inserted. Trial of void was performed and failed 2 bladder scans and Shankar was re-inserted, patient will be discharged to home with Shankar. Shankar was placed 9/7 and is 16F will need to be drained when full. No irrigation observe for signs of urinary retention or obstruction, if there are signs report to ED. Follow up with outpatient urology for removal of Shankar. 89 year old female with PMH OA, questionable CKD, HTN, GERD, depression, dementia, and hypothyroidism who presents after a fall.  Patient's home health aide was in the bathroom and she heard a loud sound in the bedroom.  Immediately after that the aide heard the patient crying out for help and she found her at the foot of the bed bleeding from her scalp.  In the ED, vital signs were BP 97/68, HR 52, RR 16, temp 97.5 F and saturating 95% on room air. Patient's labs were unremarkable.  CT head without acute pathology.  She was given IV fluids and tylenol for pain.    She was admitted to [facility] for further workup. At night, she had difficulties with agitation and sundowning. She was given seroquel 25mg PRN with good effect. On further workup, she was found to have a UA indicating likely UTI, however the urine sample was contaminated and could not be cultured. She was treated empirically with ceftriaxone for a total of 3 days. Additionally, she was found to have a an Xray right shoulder demonstrating distal clavicular fracture with separation of right AC joint. Her L shoulder had a chronic rotator cuff tear with no acute fracture. Her pelvis was also negative for fracture. Ortho was consulted for the clavicular fracture for which they placed her arm in a sling and recommended OT with ROM exercises in 2-4 weeks PT recommended CHELI placement for her rehabilitation after her fall. At the time of discharge, she was hemodynamically stable. Patient was found to be retaining urine a Shankar catheter was inserted. Trial of void was performed and failed 2 bladder scans and Shankar was re-inserted, patient will be discharged to home with Shankar. Shankar was placed 9/7 and is 16F will need to be drained when full. No irrigation observe for signs of urinary retention or obstruction, if there are signs report to ED. Follow up with outpatient urology, Dr Robertson's office for removal of Shankar. 89 year old female with PMH OA, questionable CKD, HTN, GERD, depression, dementia, and hypothyroidism who presents after a fall.  Patient's home health aide was in the bathroom and she heard a loud sound in the bedroom.  Immediately after that the aide heard the patient crying out for help and she found her at the foot of the bed bleeding from her scalp.  In the ED, vital signs were BP 97/68, HR 52, RR 16, temp 97.5 F and saturating 95% on room air. Patient's labs were unremarkable.  CT head without acute pathology.  She was given IV fluids and tylenol for pain.    She was admitted to [facility] for further workup. At night, she had difficulties with agitation and sundowning. She was given seroquel 25mg PRN with good effect. On further workup, she was found to have a UA indicating likely UTI, however the urine sample was contaminated and could not be cultured. She was treated empirically with ceftriaxone for a total of 3 days. Additionally, she was found to have a an Xray right shoulder demonstrating distal clavicular fracture with separation of right AC joint. Her L shoulder had a chronic rotator cuff tear with no acute fracture. Her pelvis was also negative for fracture. Ortho was consulted for the clavicular fracture for which they placed her arm in a sling and recommended OT with ROM exercises in 2-4 weeks PT recommended CHELI placement for her rehabilitation after her fall. At the time of discharge, she was hemodynamically stable. Patient was found to be retaining urine a Shankar catheter was inserted. Trial of void was performed and failed 2 bladder scans and Shankar was re-inserted, patient will be discharged to home with Shankar. Shankar was placed 9/7 and is 16F will need to be drained when full. No irrigation observe for signs of urinary retention or obstruction, if there are signs report to ED. Follow up with outpatient urology, Dr Robertson's office for removal of Shankar. Patient will need rehab as she is not at baseline motor ability (unable to walk and has acute urinary retention and is unable to urinate spontaneously). Patient is unable to ambulate, has distal clavicular fracture and unable to use right arm. At current state will have difficulty making outpatient follow up appointments which will increase of re-hospitalization 89 year old female with PMH OA, questionable CKD, HTN, GERD, depression, dementia, and hypothyroidism who presents after a fall.  Patient's home health aide was in the bathroom and she heard a loud sound in the bedroom.  Immediately after that the aide heard the patient crying out for help and she found her at the foot of the bed bleeding from her scalp.  In the ED, vital signs were BP 97/68, HR 52, RR 16, temp 97.5 F and saturating 95% on room air. Patient's labs were unremarkable.  CT head without acute pathology.  She was given IV fluids and tylenol for pain.    She was admitted to [facility] for further workup. At night, she had difficulties with agitation and sundowning. She was given seroquel 25mg PRN with good effect. On further workup, she was found to have a UA indicating likely UTI, however the urine sample was contaminated and could not be cultured. She was treated empirically with ceftriaxone for a total of 3 days. Additionally, she was found to have a an Xray right shoulder demonstrating distal clavicular fracture with separation of right AC joint. Her L shoulder had a chronic rotator cuff tear with no acute fracture. Her pelvis was also negative for fracture. Ortho was consulted for the clavicular fracture for which they placed her arm in a sling and recommended OT with ROM exercises in 2-4 weeks PT recommended CHELI placement for her rehabilitation after her fall. At the time of discharge, she was hemodynamically stable. Patient was found to be retaining urine a Shankar catheter was inserted. Trial of void was performed and failed 2 bladder scans and Shankar was re-inserted, patient will be discharged to home with Shankar. Shankar was placed 9/7 and is 16F will need to be drained when full. No irrigation observe for signs of urinary retention or obstruction, if there are signs report to ED. Follow up with outpatient urology, Dr Robertson's office for removal of Shankar. Patient will need rehab as she is not at baseline motor ability (unable to walk and has acute urinary retention and is unable to urinate spontaneously). Patient is unable to ambulate, has distal clavicular fracture and unable to use right arm. At current state will have difficulty making outpatient follow up appointments which will increase of re-hospitalization. Will be discharged to rehab

## 2018-09-04 NOTE — PHYSICAL THERAPY INITIAL EVALUATION ADULT - MODALITIES TREATMENT COMMENTS
FIM: 0| Evaluation limited to poor command following and poor safety awareness (significant retropulsion in seated). Patient constantly asking to use bathroom throughout eval, despite being given bedpan multiple times.

## 2018-09-04 NOTE — PROGRESS NOTE ADULT - PROBLEM SELECTOR PLAN 3
-Patient with a history of hypertension however hypotensive on admission, takes Lisinopril 40mg and amlodipine 2.5mg at home,   -c/w lisinopril 40mg   -increased amlodipine to 5mg daily in setting of elevated BP

## 2018-09-04 NOTE — PROGRESS NOTE ADULT - PROBLEM SELECTOR PLAN 6
-Patient with a history of depression  -c/w mirtazapine 15mg and escitalopram 5mg daily.    #Dementia: Sundowning, agitated  - Seroquel 25mg PRN

## 2018-09-04 NOTE — PROGRESS NOTE ADULT - PROBLEM SELECTOR PLAN 1
-Patient presents after fall at home, likely mechanical as patient as trying to get up out of bed.  Patient with dementia and poor historian and can't contribute to events prior to fall.  Home health aide heard patient's voice immediately after fall so less likely syncope.  No evidence of cardiac event, possibly vasovagal vs dehydration.  -Patient already received IVF and will encourage PO intake.  -EKG with no ischemic events  -CT head and spine without acute pathology  -Xray right shoulder demonstrated distal clavicular fracture with separation of right AC joint.   -CXR and pelvic xray negative for fracture  -ortho consulted, recs appreciated   -L shoulder w rotator cuff tear, no fx  -placed in right arm sling  -ROM exercises in 2-4 weeks  -Tylenol PRN for pain  -PT/OT consult placed, appreciate recs -Patient presents after fall at home, likely mechanical as patient as trying to get up out of bed.  Patient with dementia and poor historian and can't contribute to events prior to fall.  Home health aide heard patient's voice immediately after fall so less likely syncope.  No evidence of cardiac event, possibly vasovagal vs dehydration.  -Patient already received IVF and will encourage PO intake.  -EKG with no ischemic events  -CT head and spine without acute pathology  -Xray right shoulder demonstrated distal clavicular fracture with separation of right AC joint.   -CXR and pelvic xray negative for fracture  -L shoulder w rotator cuff tear, no fx  -placed in right arm sling  -ROM exercises in 2-4 weeks  -Tylenol PRN for pain  -PT recommending CHELI, OT recs pending

## 2018-09-05 DIAGNOSIS — R33.8 OTHER RETENTION OF URINE: ICD-10-CM

## 2018-09-05 PROCEDURE — 99233 SBSQ HOSP IP/OBS HIGH 50: CPT

## 2018-09-05 RX ORDER — INFLUENZA VIRUS VACCINE 15; 15; 15; 15 UG/.5ML; UG/.5ML; UG/.5ML; UG/.5ML
0.5 SUSPENSION INTRAMUSCULAR ONCE
Qty: 0 | Refills: 0 | Status: COMPLETED | OUTPATIENT
Start: 2018-09-05 | End: 2018-09-05

## 2018-09-05 RX ADMIN — ESCITALOPRAM OXALATE 5 MILLIGRAM(S): 10 TABLET, FILM COATED ORAL at 11:44

## 2018-09-05 RX ADMIN — POLYETHYLENE GLYCOL 3350 17 GRAM(S): 17 POWDER, FOR SOLUTION ORAL at 11:44

## 2018-09-05 RX ADMIN — Medication 25 MICROGRAM(S): at 06:41

## 2018-09-05 RX ADMIN — MIRTAZAPINE 15 MILLIGRAM(S): 45 TABLET, ORALLY DISINTEGRATING ORAL at 11:44

## 2018-09-05 RX ADMIN — AMLODIPINE BESYLATE 5 MILLIGRAM(S): 2.5 TABLET ORAL at 09:36

## 2018-09-05 RX ADMIN — Medication 650 MILLIGRAM(S): at 07:52

## 2018-09-05 RX ADMIN — HEPARIN SODIUM 5000 UNIT(S): 5000 INJECTION INTRAVENOUS; SUBCUTANEOUS at 18:08

## 2018-09-05 RX ADMIN — PANTOPRAZOLE SODIUM 40 MILLIGRAM(S): 20 TABLET, DELAYED RELEASE ORAL at 06:41

## 2018-09-05 RX ADMIN — HEPARIN SODIUM 5000 UNIT(S): 5000 INJECTION INTRAVENOUS; SUBCUTANEOUS at 06:41

## 2018-09-05 RX ADMIN — Medication 650 MILLIGRAM(S): at 07:05

## 2018-09-05 RX ADMIN — INFLUENZA VIRUS VACCINE 0.5 MILLILITER(S): 15; 15; 15; 15 SUSPENSION INTRAMUSCULAR at 19:24

## 2018-09-05 RX ADMIN — SENNA PLUS 2 TABLET(S): 8.6 TABLET ORAL at 21:47

## 2018-09-05 RX ADMIN — LISINOPRIL 40 MILLIGRAM(S): 2.5 TABLET ORAL at 14:57

## 2018-09-05 NOTE — PROGRESS NOTE ADULT - PROBLEM SELECTOR PLAN 3
-Patient with foul smelling, cloudy urine and + UA  -s/p ceftriaxone for a total of 3 days, completed course

## 2018-09-05 NOTE — OCCUPATIONAL THERAPY INITIAL EVALUATION ADULT - PERTINENT HX OF CURRENT PROBLEM, REHAB EVAL
89 y/o F presents after a fall.  Patient's home health aide was in the bathroom and she heard a loud sound in the bedroom.  Immediately after that the aide heard the patient crying out for help and she found her at the foot of the bed bleeding from her scalp. Right distal 3rd clavicle fracture, immobilize arm in sling, begin gentle ROM exercises at 2-4 weeks as per ortho 9/3. L shoulder w rotator cuff tear, no fx.

## 2018-09-05 NOTE — OCCUPATIONAL THERAPY INITIAL EVALUATION ADULT - LEVEL OF INDEPENDENCE: GROOMING, OT EVAL
patient can comb hair with left hand, difficulty reaching far back 2/2 limited proximal ROM/minimum assist (75% patients effort)

## 2018-09-05 NOTE — PROGRESS NOTE ADULT - SUBJECTIVE AND OBJECTIVE BOX
Physical Medicine and Rehabilitation Progress Note:    Patient is a 89y old  Female who presents with a chief complaint of Fall (05 Sep 2018 08:56)      HPI:  89 year old female with PMH OA, questionable CKD, HTN, GERD, depression, dementia, and hypothyroidism who presents after a fall.  Patient's home health aide was in the bathroom and she heard a loud sound in the bedroom.  Immediately after that the aide heard the patient crying out for help and she found her at the foot of the bed bleeding from her scalp.  In the ED, vital signs were BP 97/68, HR 52, RR 16, temp 97.5 F and saturating 95% on room air. Patient's labs were unremarkable.  CT head without acute pathology.  She was given IV fluids and tylenol for pain. (01 Sep 2018 12:12)                Vital Signs Last 24 Hrs  T(C): 36.9 (05 Sep 2018 14:53), Max: 36.9 (05 Sep 2018 14:53)  T(F): 98.5 (05 Sep 2018 14:53), Max: 98.5 (05 Sep 2018 14:53)  HR: 55 (05 Sep 2018 14:53) (47 - 56)  BP: 111/58 (05 Sep 2018 14:53) (103/58 - 144/73)  BP(mean): --  RR: 16 (05 Sep 2018 14:53) (16 - 16)  SpO2: 94% (05 Sep 2018 14:53) (94% - 97%)    MEDICATIONS  (STANDING):  amLODIPine   Tablet 5 milliGRAM(s) Oral every 24 hours  escitalopram 5 milliGRAM(s) Oral daily  heparin  Injectable 5000 Unit(s) SubCutaneous every 12 hours  influenza   Vaccine 0.5 milliLiter(s) IntraMuscular once  levothyroxine 25 MICROGram(s) Oral daily  lisinopril 40 milliGRAM(s) Oral every 24 hours  mirtazapine 15 milliGRAM(s) Oral daily  pantoprazole    Tablet 40 milliGRAM(s) Oral before breakfast  polyethylene glycol 3350 17 Gram(s) Oral daily  senna 2 Tablet(s) Oral at bedtime    MEDICATIONS  (PRN):  acetaminophen   Tablet. 650 milliGRAM(s) Oral every 6 hours PRN Severe Pain (7 - 10)  QUEtiapine 25 milliGRAM(s) Oral at bedtime PRN Agitation    Currently Undergoing Physical Therapy at bedside.    Functional Status Assessment:    Previous Level of Function:     · Additional Comments  Patient is poor historian, information obtained via chart. Patient lives in low-income housing with 24/7 home health assistance. Patient is R handed.            Cognitive Status Examination:   · Orientation  person    · Level of Consciousness  confused    · Follows Commands and Answers Questions  Allows commands <25% of time with heavy verbal and tactile cueing.    · Personal Safety and Judgment  impaired      Manual Muscle Testing:   · Manual Muscle Testing Results  Unable to assess secondary to poor command following.      Bed Mobility: Rolling/Turning:     · Level of Beatrice  moderate assist (50% patients effort)    · Physical Assist/Nonphysical Assist  2 person assist      Bed Mobility: Scooting/Bridging:     · Level of Beatrice  maximum assist (25% patients effort)    · Physical Assist/Nonphysical Assist  2 person assist      Bed Mobility: Sit to Supine:     · Level of Beatrice  maximum assist (25% patients effort)    · Physical Assist/Nonphysical Assist  1 person assist      Bed Mobility: Supine to Sit:     · Level of Beatrice  maximum assist (25% patients effort)    · Physical Assist/Nonphysical Assist  1 person assist      Bed Mobility Analysis:     · Bed Mobility Limitations  decreased ability to use legs for bridging/pushing; decreased ability to use arms for pushing/pulling    · Impairments Contributing to Impaired Bed Mobility  cognition; pain; decreased strength      Balance Skills Assessment:     · Sitting Balance: Static  poor balance    · Sitting Balance: Dynamic  poor balance    · Systems Impairment Contributing to Balance Disturbance  cognitive; musculoskeletal    · Identified Impairments Contributing to Balance Disturbance  pain; decreased strength      Treatment Location:   · Comments  FIM: 0| Evaluation limited to poor command following and poor safety awareness (significant retropulsion in seated). Patient constantly asking to use bathroom throughout eval, despite being given bedpan multiple times.      Clinical Impressions:   · Criteria for Skilled Therapeutic Interventions  impairments found; functional limitations in following categories; rehab potential; therapy frequency    · Impairments Found (describe specific impairments)  aerobic capacity/endurance; gait, locomotion, and balance; muscle strength    · Functional Limitations in Following Categories (describe specific limitations)  self-care; home management; community/leisure    · Rehab Potential  good, to achieve stated therapy goals    · Therapy Frequency  2-3x/week          PM&R Impression: as above    Disposition Plan Recommendations: subacute rehab placement

## 2018-09-05 NOTE — OCCUPATIONAL THERAPY INITIAL EVALUATION ADULT - DIAGNOSIS, OT EVAL
Impaired functional mobility and activities of daily living 2/2 cognitive deficits, right upper extremity NWB, decreased balance and overall strength

## 2018-09-05 NOTE — PROGRESS NOTE ADULT - PROBLEM SELECTOR PLAN 2
-Patient presents after fall at home, likely mechanical as patient as trying to get up out of bed.  Patient with dementia and poor historian and can't contribute to events prior to fall.  Home health aide heard patient's voice immediately after fall so less likely syncope.  No evidence of cardiac event, possibly vasovagal vs dehydration.  -Patient already received IVF and will encourage PO intake.  -EKG with no ischemic events  -CT head and spine without acute pathology  -Xray right shoulder demonstrated distal clavicular fracture with separation of right AC joint.   -CXR and pelvic xray negative for fracture  -L shoulder w rotator cuff tear, no fx  -placed in right arm sling  -ROM exercises in 2-4 weeks  -Tylenol PRN for pain  -PT recommending [facility], OT recs pending

## 2018-09-05 NOTE — OCCUPATIONAL THERAPY INITIAL EVALUATION ADULT - RANGE OF MOTION EXAMINATION, UPPER EXTREMITY
right upper extremity NT-NWB, wrist flex/ext and digits flex/ext, thumb opposition AROM WFL; left upper extremity shoulder flexion 90 degrees 2/2 h/o rotator cuff tear right upper extremity NT-NWB, wrist flex/ext and digits flex/ext, thumb opposition AROM WFL; left upper extremity shoulder flexion 90 degrees 2/2 h/o rotator cuff tear no fx, left elbow/wrist/digits AROM WFL

## 2018-09-05 NOTE — PROGRESS NOTE ADULT - PROBLEM SELECTOR PLAN 9
-HSQ  -Esomeprazole 40mg daily  #Nutrition, metabolism, and development symptoms  -F: No IVF indicated  -E: Will replete to K>4 and Mg>2  -N: DASH/TLC diet    Dispo: Pending [facility] placement

## 2018-09-05 NOTE — OCCUPATIONAL THERAPY INITIAL EVALUATION ADULT - LEVEL OF INDEPENDENCE: SIT/STAND, REHAB EVAL
maximum assist (25% patients effort)/left Hand Held Assist, bilateral knee buckling and right trunk support; patient able to stand once and semi-stand 2nd trial

## 2018-09-05 NOTE — PROGRESS NOTE ADULT - PROBLEM SELECTOR PLAN 10
Problem: Transition of care performed with sharing of clinical summary.  Plan: 1) PCP Contacted on Admission: (Y/N) --> Name & Phone #:  Dr Russ Alvarez [phone] or [phone]  2) Date of Contact with PCP:  3) PCP Contacted at Discharge: (Y/N, N/A)  4) Summary of Handoff Given to PCP:   5) Post-Discharge Appointment Date and Location:

## 2018-09-05 NOTE — OCCUPATIONAL THERAPY INITIAL EVALUATION ADULT - IMPAIRED TRANSFERS: SIT/STAND, REHAB EVAL
impaired postural control/decreased strength/right upper extremity NWB; kyphotic posture/impaired balance

## 2018-09-05 NOTE — OCCUPATIONAL THERAPY INITIAL EVALUATION ADULT - GENERAL OBSERVATIONS, REHAB EVAL
Right hand dominant. Patient cleared for Occupational Therapy by RN, patient received  in non-acute distress. Right hand dominant. Patient cleared for Occupational Therapy by GABO David, patient received supine in semi-pappas position in non-acute distress. +IV heplock, + right clavicular area bruising, + right upper extremity sling, +bed alarm. Patient denies pain at rest, reports right clavicle pain with bed mobility unable to rate.

## 2018-09-05 NOTE — OCCUPATIONAL THERAPY INITIAL EVALUATION ADULT - ADDITIONAL COMMENTS
Patient is poor historian. As per medical chart, patient lives in low-income housing with 24/7 home health assistance.

## 2018-09-05 NOTE — OCCUPATIONAL THERAPY INITIAL EVALUATION ADULT - PLANNED THERAPY INTERVENTIONS, OT EVAL
parent/caregiver training.../transfer training/ADL retraining/balance training/bed mobility training

## 2018-09-05 NOTE — OCCUPATIONAL THERAPY INITIAL EVALUATION ADULT - IMPAIRMENTS CONTRIBUTING IMPAIRED BED MOBILITY, REHAB EVAL
impaired balance/cognition/impaired postural control/pain/decreased strength/right upper extremity NWB; kyphotic posture/decreased ROM

## 2018-09-05 NOTE — PROGRESS NOTE ADULT - SUBJECTIVE AND OBJECTIVE BOX
SEKOU AJ 89y Female    Interval HPI:  Patient had no complaints. Patient is demented at baseline and is only oriented to place and person. Denied any headache, chest pain, abdominal pain. No N/V/D. Denies fever, cough, chills, SOB or palpitations.       Patient seen and examined at bedside:    T(C): 36.7 (18 @ 05:38), Max: 36.8 (18 @ 12:22)  HR: 56 (18 @ 05:38) (51 - 56)  BP: 126/58 (18 @ 05:38) (126/58 - 144/75)  RR: 16 (18 @ 05:38) (16 - 18)  SpO2: 94% (18 @ 05:38) (94% - 97%)    Review of systems negative except as mentioned above    acetaminophen   Tablet. 650 milliGRAM(s) Oral every 6 hours PRN  amLODIPine   Tablet 5 milliGRAM(s) Oral every 24 hours  escitalopram 5 milliGRAM(s) Oral daily  heparin  Injectable 5000 Unit(s) SubCutaneous every 12 hours  influenza   Vaccine 0.5 milliLiter(s) IntraMuscular once  levothyroxine 25 MICROGram(s) Oral daily  lisinopril 40 milliGRAM(s) Oral every 24 hours  mirtazapine 15 milliGRAM(s) Oral daily  pantoprazole    Tablet 40 milliGRAM(s) Oral before breakfast  polyethylene glycol 3350 17 Gram(s) Oral daily  QUEtiapine 25 milliGRAM(s) Oral at bedtime PRN  senna 2 Tablet(s) Oral at bedtime      Physical Exam:    GENERAL: Lying in bed disoriented with agu[place] in place with cloudy yellow urine  HEENT: PERRL, MMM  NECK: No LAD  RESPIRATORY: CTAB  CV: S1S2 appreciate, RRR, no m/r/g  GI: Soft, NT/ND, normal active bowel sounds  EXT: Sling on right shoulder, right shoulder and right arm with ecchymosis. Right shoulder tender to palpation.  NEURO: AOx2 (only to person and place) at baseline  MSK: Frail, decreased strength on right upper extremity      Labs:            Urinalysis Basic - ( 04 Sep 2018 15:07 )    Color: Yellow / Appearance: Turbid / S.025 / pH: x  Gluc: x / Ketone: Trace mg/dL  / Bili: Negative / Urobili: 0.2 E.U./dL   Blood: x / Protein: 100 mg/dL / Nitrite: NEGATIVE   Leuk Esterase: Large / RBC: < 5 /HPF / WBC Many /HPF   Sq Epi: x / Non Sq Epi: 0-5 /HPF / Bacteria: Present /HPF        CAPILLARY BLOOD GLUCOSE                Imaging:

## 2018-09-06 LAB
BASOPHILS NFR BLD AUTO: 0.4 % — SIGNIFICANT CHANGE UP (ref 0–2)
CULTURE RESULTS: NO GROWTH — SIGNIFICANT CHANGE UP
EOSINOPHIL NFR BLD AUTO: 3.4 % — SIGNIFICANT CHANGE UP (ref 0–6)
HCT VFR BLD CALC: 28.2 % — LOW (ref 34.5–45)
HGB BLD-MCNC: 9.4 G/DL — LOW (ref 11.5–15.5)
LYMPHOCYTES # BLD AUTO: 15.5 % — SIGNIFICANT CHANGE UP (ref 13–44)
MCHC RBC-ENTMCNC: 31.2 PG — SIGNIFICANT CHANGE UP (ref 27–34)
MCHC RBC-ENTMCNC: 33.3 G/DL — SIGNIFICANT CHANGE UP (ref 32–36)
MCV RBC AUTO: 93.7 FL — SIGNIFICANT CHANGE UP (ref 80–100)
MONOCYTES NFR BLD AUTO: 7.9 % — SIGNIFICANT CHANGE UP (ref 2–14)
NEUTROPHILS NFR BLD AUTO: 72.8 % — SIGNIFICANT CHANGE UP (ref 43–77)
PLATELET # BLD AUTO: 220 K/UL — SIGNIFICANT CHANGE UP (ref 150–400)
RBC # BLD: 3.01 M/UL — LOW (ref 3.8–5.2)
RBC # FLD: 14.3 % — SIGNIFICANT CHANGE UP (ref 10.3–16.9)
SPECIMEN SOURCE: SIGNIFICANT CHANGE UP
WBC # BLD: 7.4 K/UL — SIGNIFICANT CHANGE UP (ref 3.8–10.5)
WBC # FLD AUTO: 7.4 K/UL — SIGNIFICANT CHANGE UP (ref 3.8–10.5)

## 2018-09-06 PROCEDURE — 99233 SBSQ HOSP IP/OBS HIGH 50: CPT

## 2018-09-06 RX ADMIN — PANTOPRAZOLE SODIUM 40 MILLIGRAM(S): 20 TABLET, DELAYED RELEASE ORAL at 07:15

## 2018-09-06 RX ADMIN — Medication 25 MICROGRAM(S): at 05:50

## 2018-09-06 RX ADMIN — SENNA PLUS 2 TABLET(S): 8.6 TABLET ORAL at 22:08

## 2018-09-06 RX ADMIN — Medication 650 MILLIGRAM(S): at 10:40

## 2018-09-06 RX ADMIN — Medication 650 MILLIGRAM(S): at 10:12

## 2018-09-06 RX ADMIN — Medication 650 MILLIGRAM(S): at 01:04

## 2018-09-06 RX ADMIN — POLYETHYLENE GLYCOL 3350 17 GRAM(S): 17 POWDER, FOR SOLUTION ORAL at 12:11

## 2018-09-06 RX ADMIN — LISINOPRIL 40 MILLIGRAM(S): 2.5 TABLET ORAL at 12:11

## 2018-09-06 RX ADMIN — HEPARIN SODIUM 5000 UNIT(S): 5000 INJECTION INTRAVENOUS; SUBCUTANEOUS at 17:59

## 2018-09-06 RX ADMIN — QUETIAPINE FUMARATE 25 MILLIGRAM(S): 200 TABLET, FILM COATED ORAL at 22:08

## 2018-09-06 RX ADMIN — HEPARIN SODIUM 5000 UNIT(S): 5000 INJECTION INTRAVENOUS; SUBCUTANEOUS at 05:50

## 2018-09-06 RX ADMIN — Medication 650 MILLIGRAM(S): at 02:04

## 2018-09-06 RX ADMIN — MIRTAZAPINE 15 MILLIGRAM(S): 45 TABLET, ORALLY DISINTEGRATING ORAL at 12:11

## 2018-09-06 RX ADMIN — AMLODIPINE BESYLATE 5 MILLIGRAM(S): 2.5 TABLET ORAL at 09:00

## 2018-09-06 RX ADMIN — ESCITALOPRAM OXALATE 5 MILLIGRAM(S): 10 TABLET, FILM COATED ORAL at 12:11

## 2018-09-06 NOTE — PROGRESS NOTE ADULT - SUBJECTIVE AND OBJECTIVE BOX
SEKOU AJ 89y Female    Interval HPI:  Patient had no new complaints overnight. Patient failed TOV yesterday, first bladder scan revealed 900cc and second bladder scan 6 hours later revealed greater than 400cc of urine. Shankar catheter was replaced. Patient denied any fever, chills, night sweats. Patient is at baseline dementia oriented to person and place and not time.        Patient seen and examined at bedside:    T(C): 36.7 (18 @ 05:27), Max: 36.9 (18 @ 14:53)  HR: 61 (18 @ 05:27) (47 - 61)  BP: 125/59 (18 @ 05:27) (103/58 - 144/73)  RR: 14 (18 @ 05:27) (14 - 16)  SpO2: 96% (18 @ 05:27) (94% - 97%)    Review of systems negative except as mentioned above    acetaminophen   Tablet. 650 milliGRAM(s) Oral every 6 hours PRN  amLODIPine   Tablet 5 milliGRAM(s) Oral every 24 hours  escitalopram 5 milliGRAM(s) Oral daily  heparin  Injectable 5000 Unit(s) SubCutaneous every 12 hours  levothyroxine 25 MICROGram(s) Oral daily  lisinopril 40 milliGRAM(s) Oral every 24 hours  mirtazapine 15 milliGRAM(s) Oral daily  pantoprazole    Tablet 40 milliGRAM(s) Oral before breakfast  polyethylene glycol 3350 17 Gram(s) Oral daily  QUEtiapine 25 milliGRAM(s) Oral at bedtime PRN  senna 2 Tablet(s) Oral at bedtime      Physical Exam:    GENERAL: Frail, lying in bed in an improved mood from yesterday, NAD  HEENT: NC/AT, MMM, PERRL  NECK: Supple, no LAD  RESPIRATORY: CTAB, adequate depth and effort  CV: S1S2 appreciated, RRR, no m/r/g  GI: Soft, NT/ND, normal active bowel sounds  EXT: 2+ pulses in upper and lower ext B/L, no cyanosis, no edema  NEURO: AOx2 (only oriented to person and place)  MSK: Decreased bulk and tone, NROM      Labs:                        9.4    7.4   )-----------( 220      ( 06 Sep 2018 05:33 )             28.2             Urinalysis Basic - ( 04 Sep 2018 15:07 )    Color: Yellow / Appearance: Turbid / S.025 / pH: x  Gluc: x / Ketone: Trace mg/dL  / Bili: Negative / Urobili: 0.2 E.U./dL   Blood: x / Protein: 100 mg/dL / Nitrite: NEGATIVE   Leuk Esterase: Large / RBC: < 5 /HPF / WBC Many /HPF   Sq Epi: x / Non Sq Epi: 0-5 /HPF / Bacteria: Present /HPF        CAPILLARY BLOOD GLUCOSE                Imaging: SEKOU AJ 89y Female    Interval HPI:  Patient had no new complaints overnight. Patient failed TOV yesterday, first bladder scan revealed 900cc and second bladder scan 6 hours later revealed greater than 400cc of urine. Shankar catheter was replaced. Patient denied any fever, chills, night sweats. Patient is at baseline dementia oriented to person and place and not time.        Patient seen and examined at bedside:    T(C): 36.7 (18 @ 05:27), Max: 36.9 (18 @ 14:53)  HR: 61 (18 @ 05:27) (47 - 61)  BP: 125/59 (18 @ 05:27) (103/58 - 144/73)  RR: 14 (18 @ 05:27) (14 - 16)  SpO2: 96% (18 @ 05:27) (94% - 97%)    Review of systems negative except as mentioned above    acetaminophen   Tablet. 650 milliGRAM(s) Oral every 6 hours PRN  amLODIPine   Tablet 5 milliGRAM(s) Oral every 24 hours  escitalopram 5 milliGRAM(s) Oral daily  heparin  Injectable 5000 Unit(s) SubCutaneous every 12 hours  levothyroxine 25 MICROGram(s) Oral daily  lisinopril 40 milliGRAM(s) Oral every 24 hours  mirtazapine 15 milliGRAM(s) Oral daily  pantoprazole    Tablet 40 milliGRAM(s) Oral before breakfast  polyethylene glycol 3350 17 Gram(s) Oral daily  QUEtiapine 25 milliGRAM(s) Oral at bedtime PRN  senna 2 Tablet(s) Oral at bedtime      Physical Exam:    GENERAL: Frail, lying in bed in an improved mood from yesterday, NAD  HEENT: NC/AT, MMM, PERRL  NECK: Supple, no LAD  RESPIRATORY: CTAB, adequate depth and effort  CV: S1S2 appreciated, RRR, no m/r/g  GI: Soft, NT/ND, normal active bowel sounds  EXT: 2+ pulses in upper and lower ext B/L, no cyanosis, no edema  NEURO: AOx2 (only oriented to person and place - hospital), cn ii - xii intact , 4/5 str all 4 ext. sensation intact bilaterally  MSK: Decreased bulk and tone, NROM      Labs:                        9.4    7.4   )-----------( 220      ( 06 Sep 2018 05:33 )             28.2             Urinalysis Basic - ( 04 Sep 2018 15:07 )    Color: Yellow / Appearance: Turbid / S.025 / pH: x  Gluc: x / Ketone: Trace mg/dL  / Bili: Negative / Urobili: 0.2 E.U./dL   Blood: x / Protein: 100 mg/dL / Nitrite: NEGATIVE   Leuk Esterase: Large / RBC: < 5 /HPF / WBC Many /HPF   Sq Epi: x / Non Sq Epi: 0-5 /HPF / Bacteria: Present /HPF        CAPILLARY BLOOD GLUCOSE                Imaging:

## 2018-09-06 NOTE — DIETITIAN INITIAL EVALUATION ADULT. - PROBLEM SELECTOR PLAN 7
Problem: Transition of care performed with sharing of clinical summary.  Plan: 1) PCP Contacted on Admission: (Y/N) --> Name & Phone #:  Dr Russ Alvarez [phone] or [phone]  2) Date of Contact with PCP:  3) PCP Contacted at Discharge: (Y/N, N/A)  4) Summary of Handoff Given to PCP:   5) Post-Discharge Appointment Date and Location:

## 2018-09-06 NOTE — DIETITIAN INITIAL EVALUATION ADULT. - OTHER INFO
89 year old female with PMH OA, questionable CKD, HTN, GERD, depression, dementia, and hypothyroidism who presents after a fall with right distal clavicular fracture. Pt unable to provide complete history as pt is a poor historian. No n/v/d/c noted. Pt ate ~25-50% of breakfast tray. Reports she eats "everything" at home as denies any appetite changes. Pt appears thin/underweight. Unable to obtain weight history. NKFA noted. Denies any pain. Stage I pressure ulcer noted.

## 2018-09-06 NOTE — PROGRESS NOTE ADULT - PROBLEM SELECTOR PLAN 10
Problem: Transition of care performed with sharing of clinical summary.  Plan: 1) PCP Contacted on Admission: (Y/N) --> Name & Phone #:  Dr Russ Alvarez [phone] or [phone]  2) Date of Contact with PCP:  3) PCP Contacted at Discharge: (Y/N, N/A)  4) Summary of Handoff Given to PCP:   5) Post-Discharge Appointment Date and Location:

## 2018-09-06 NOTE — DIETITIAN INITIAL EVALUATION ADULT. - PROBLEM SELECTOR PLAN 1
-Patient presents after fall at home, likely mechanical as patient as trying to get up out of bed.  Patient with dementia and poor historian and can't contribute to events prior to fall.  Home health aide heard patient's voice immediately after fall so less likely syncope.  No evidence of cardiac event, possibly vasovagal vs dehydration.  -Patient already received IVF and will encourage PO intake.  -EKG with no ischemic events but read states no P waves although P waves evident, will obtain another EKG.  -CT head and spine without acute pathology  -Will follow up xrays of hip and shoulder  -Tylenol PRN for pain  -PT consult

## 2018-09-06 NOTE — CHART NOTE - NSCHARTNOTEFT_GEN_A_CORE
Upon Nutritional Assessment by the Registered Dietitian your patient was determined to meet criteria / has evidence of the following diagnosis/diagnoses:          [ ]  Mild Protein Calorie Malnutrition        [ ]  Moderate Protein Calorie Malnutrition        [ ] Severe Protein Calorie Malnutrition        [ ] Unspecified Protein Calorie Malnutrition        [X ] Underweight / BMI <19        [ ] Morbid Obesity / BMI > 40      Findings as based on:  •  Comprehensive nutrition assessment and consultation  •  Calorie counts (nutrient intake analysis)  •  Food acceptance and intake status from observations by staff  •  Follow up  •  Patient education  •  Intervention secondary to interdisciplinary rounds  •   concerns      Treatment:    The following diet has been recommended: Rec. Ensure Enlive TID       PROVIDER Section:     By signing this assessment you are acknowledging and agree with the diagnosis/diagnoses assigned by the Registered Dietitian    Comments:

## 2018-09-06 NOTE — PROGRESS NOTE ADULT - SUBJECTIVE AND OBJECTIVE BOX
Physical Medicine and Rehabilitation Progress Note:    Patient is a 89y old  Female who presents with a chief complaint of Fall (06 Sep 2018 08:20)      HPI:  89 year old female with PMH OA, questionable CKD, HTN, GERD, depression, dementia, and hypothyroidism who presents after a fall.  Patient's home health aide was in the bathroom and she heard a loud sound in the bedroom.  Immediately after that the aide heard the patient crying out for help and she found her at the foot of the bed bleeding from her scalp.  In the ED, vital signs were BP 97/68, HR 52, RR 16, temp 97.5 F and saturating 95% on room air. Patient's labs were unremarkable.  CT head without acute pathology.  She was given IV fluids and tylenol for pain. (01 Sep 2018 12:12)                            9.4    7.4   )-----------( 220      ( 06 Sep 2018 05:33 )             28.2             Vital Signs Last 24 Hrs  T(C): 36.8 (06 Sep 2018 12:14), Max: 36.9 (05 Sep 2018 20:40)  T(F): 98.2 (06 Sep 2018 12:14), Max: 98.4 (05 Sep 2018 20:40)  HR: 56 (06 Sep 2018 12:14) (53 - 61)  BP: 142/74 (06 Sep 2018 12:14) (118/65 - 142/74)  BP(mean): --  RR: 16 (06 Sep 2018 12:14) (14 - 16)  SpO2: 96% (06 Sep 2018 12:14) (95% - 96%)    MEDICATIONS  (STANDING):  amLODIPine   Tablet 5 milliGRAM(s) Oral every 24 hours  escitalopram 5 milliGRAM(s) Oral daily  heparin  Injectable 5000 Unit(s) SubCutaneous every 12 hours  levothyroxine 25 MICROGram(s) Oral daily  lisinopril 40 milliGRAM(s) Oral every 24 hours  mirtazapine 15 milliGRAM(s) Oral daily  pantoprazole    Tablet 40 milliGRAM(s) Oral before breakfast  polyethylene glycol 3350 17 Gram(s) Oral daily  senna 2 Tablet(s) Oral at bedtime    MEDICATIONS  (PRN):  acetaminophen   Tablet. 650 milliGRAM(s) Oral every 6 hours PRN Severe Pain (7 - 10)  QUEtiapine 25 milliGRAM(s) Oral at bedtime PRN Agitation    Currently Undergoing Physical Therapy at bedside.    Functional Status Assessment:    Pain Assessment/Number Scale (0-10) Adult  Presence of Pain: complains of pain/discomfort  Pain Body Location: Right:   shoulder  Pain Rating (0-10): Rest: 5   Pain Rating (0-10): Activity: 7     Therapeutic Interventions      Bed Mobility  Bed Mobility Training Symptoms Noted During/After Treatment: increased pain  Bed Mobility Training Scooting: maximum assist (25% patient effort);  1 person assist;  verbal cues  Bed Mobility Training Sit-to-Supine: maximum assist (25% patient effort);  1 person assist  Bed Mobility Training Supine-to-Sit: maximum assist (25% patient effort);  1 person assist  Bed Mobility Training Limitations: decreased ability to use legs for bridging/pushing;  decreased ability to use arms for pushing/pulling;  impaired ability to control trunk for mobility;  decreased strength;  impaired postural control;  pain;  cognitive, decreased safety awareness;  decreased ROM    Therapeutic Exercise  Therapeutic Exercise Symptoms Noted During/After Treatment: fatigue;  increased pain  Therapeutic Exercise Detail: Seated Edge of Bed TherexLong arc quads x 10 bilaterally (assisted for last 40 degrees of extension)Hip flexion x 10 bilaterally (assisted at terminal range)           PM&R Impression: as above    Disposition Plan Recommendations: subacute rehab placement

## 2018-09-06 NOTE — DIETITIAN INITIAL EVALUATION ADULT. - ENERGY NEEDS
Height: 60" Weight: 91lbs, IBW 100lbs+/-10%, %IBW 91%, BMI - 17.8  ABW used to calculate energy needs due to pt's current body weight within % IBW   Nutrient needs based on [facility] standards of care for repletion in older adults 2/2 underweight status, suspected malnutrition

## 2018-09-07 LAB
ANION GAP SERPL CALC-SCNC: 13 MMOL/L — SIGNIFICANT CHANGE UP (ref 5–17)
BUN SERPL-MCNC: 36 MG/DL — HIGH (ref 7–23)
CALCIUM SERPL-MCNC: 9 MG/DL — SIGNIFICANT CHANGE UP (ref 8.4–10.5)
CHLORIDE SERPL-SCNC: 102 MMOL/L — SIGNIFICANT CHANGE UP (ref 96–108)
CO2 SERPL-SCNC: 22 MMOL/L — SIGNIFICANT CHANGE UP (ref 22–31)
CREAT SERPL-MCNC: 0.91 MG/DL — SIGNIFICANT CHANGE UP (ref 0.5–1.3)
GLUCOSE SERPL-MCNC: 95 MG/DL — SIGNIFICANT CHANGE UP (ref 70–99)
HCT VFR BLD CALC: 30 % — LOW (ref 34.5–45)
HGB BLD-MCNC: 9.8 G/DL — LOW (ref 11.5–15.5)
MAGNESIUM SERPL-MCNC: 2.1 MG/DL — SIGNIFICANT CHANGE UP (ref 1.6–2.6)
MCHC RBC-ENTMCNC: 30.9 PG — SIGNIFICANT CHANGE UP (ref 27–34)
MCHC RBC-ENTMCNC: 32.7 G/DL — SIGNIFICANT CHANGE UP (ref 32–36)
MCV RBC AUTO: 94.6 FL — SIGNIFICANT CHANGE UP (ref 80–100)
PLATELET # BLD AUTO: 234 K/UL — SIGNIFICANT CHANGE UP (ref 150–400)
POTASSIUM SERPL-MCNC: 4.6 MMOL/L — SIGNIFICANT CHANGE UP (ref 3.5–5.3)
POTASSIUM SERPL-SCNC: 4.6 MMOL/L — SIGNIFICANT CHANGE UP (ref 3.5–5.3)
RBC # BLD: 3.17 M/UL — LOW (ref 3.8–5.2)
RBC # FLD: 14.7 % — SIGNIFICANT CHANGE UP (ref 10.3–16.9)
SODIUM SERPL-SCNC: 137 MMOL/L — SIGNIFICANT CHANGE UP (ref 135–145)
WBC # BLD: 6.7 K/UL — SIGNIFICANT CHANGE UP (ref 3.8–10.5)
WBC # FLD AUTO: 6.7 K/UL — SIGNIFICANT CHANGE UP (ref 3.8–10.5)

## 2018-09-07 PROCEDURE — 99233 SBSQ HOSP IP/OBS HIGH 50: CPT | Mod: GC

## 2018-09-07 PROCEDURE — 74018 RADEX ABDOMEN 1 VIEW: CPT | Mod: 26

## 2018-09-07 RX ORDER — ONDANSETRON 8 MG/1
4 TABLET, FILM COATED ORAL ONCE
Qty: 0 | Refills: 0 | Status: COMPLETED | OUTPATIENT
Start: 2018-09-07 | End: 2018-09-07

## 2018-09-07 RX ADMIN — AMLODIPINE BESYLATE 5 MILLIGRAM(S): 2.5 TABLET ORAL at 09:36

## 2018-09-07 RX ADMIN — Medication 650 MILLIGRAM(S): at 22:49

## 2018-09-07 RX ADMIN — Medication 25 MICROGRAM(S): at 05:51

## 2018-09-07 RX ADMIN — ESCITALOPRAM OXALATE 5 MILLIGRAM(S): 10 TABLET, FILM COATED ORAL at 11:23

## 2018-09-07 RX ADMIN — SENNA PLUS 2 TABLET(S): 8.6 TABLET ORAL at 22:06

## 2018-09-07 RX ADMIN — POLYETHYLENE GLYCOL 3350 17 GRAM(S): 17 POWDER, FOR SOLUTION ORAL at 11:23

## 2018-09-07 RX ADMIN — Medication 650 MILLIGRAM(S): at 11:55

## 2018-09-07 RX ADMIN — Medication 650 MILLIGRAM(S): at 22:07

## 2018-09-07 RX ADMIN — Medication 10 MILLIGRAM(S): at 12:21

## 2018-09-07 RX ADMIN — ONDANSETRON 4 MILLIGRAM(S): 8 TABLET, FILM COATED ORAL at 10:15

## 2018-09-07 RX ADMIN — HEPARIN SODIUM 5000 UNIT(S): 5000 INJECTION INTRAVENOUS; SUBCUTANEOUS at 18:00

## 2018-09-07 RX ADMIN — MIRTAZAPINE 15 MILLIGRAM(S): 45 TABLET, ORALLY DISINTEGRATING ORAL at 11:22

## 2018-09-07 RX ADMIN — HEPARIN SODIUM 5000 UNIT(S): 5000 INJECTION INTRAVENOUS; SUBCUTANEOUS at 05:51

## 2018-09-07 RX ADMIN — Medication 650 MILLIGRAM(S): at 11:23

## 2018-09-07 RX ADMIN — LISINOPRIL 40 MILLIGRAM(S): 2.5 TABLET ORAL at 16:05

## 2018-09-07 NOTE — PROGRESS NOTE ADULT - SUBJECTIVE AND OBJECTIVE BOX
SEKOU AJ 89y Female    Interval HPI:  Patient had no new complaints overnight. Shankar catheter in place. Patient denied any fever, chills, night sweats. Patient is at baseline dementia oriented to person and place and not time.      Patient seen and examined at bedside:    T(C): 37.1 (09-07-18 @ 05:38), Max: 37.1 (09-07-18 @ 05:38)  HR: 62 (09-07-18 @ 05:38) (54 - 62)  BP: 134/70 (09-07-18 @ 05:38) (129/63 - 142/74)  RR: 15 (09-07-18 @ 05:38) (15 - 16)  SpO2: 96% (09-07-18 @ 05:38) (94% - 96%)    Review of systems negative except as mentioned above    acetaminophen   Tablet. 650 milliGRAM(s) Oral every 6 hours PRN  amLODIPine   Tablet 5 milliGRAM(s) Oral every 24 hours  escitalopram 5 milliGRAM(s) Oral daily  heparin  Injectable 5000 Unit(s) SubCutaneous every 12 hours  levothyroxine 25 MICROGram(s) Oral daily  lisinopril 40 milliGRAM(s) Oral every 24 hours  mirtazapine 15 milliGRAM(s) Oral daily  pantoprazole    Tablet 40 milliGRAM(s) Oral before breakfast  polyethylene glycol 3350 17 Gram(s) Oral daily  QUEtiapine 25 milliGRAM(s) Oral at bedtime PRN  senna 2 Tablet(s) Oral at bedtime      Physical Exam:    GENERAL: Frail, lying in bed in an improved mood from yesterday, NAD  HEENT: NC/AT, MMM, PERRL  NECK: Supple, no LAD  RESPIRATORY: CTAB, adequate depth and effort  CV: S1S2 appreciated, RRR, no m/r/g  GI: Soft, NT/ND, normal active bowel sounds  EXT: 2+ pulses in upper and lower ext B/L, no cyanosis, no edema  NEURO: AOx2 (only oriented to person and place - hospital), cn ii - xii intact , 4/5 str all 4 ext. sensation intact bilaterally  MSK: Decreased bulk and tone, NROM      Labs:                        9.8    6.7   )-----------( 234      ( 07 Sep 2018 07:35 )             30.0                 CAPILLARY BLOOD GLUCOSE                Imaging:

## 2018-09-07 NOTE — PROGRESS NOTE ADULT - PROBLEM SELECTOR PLAN 1
-Failed (first bladder scan revealed 900cc and second bladder scan 6 hours later revealed greater than 400cc of urine). TOV, straight catheterized and urine cx collected, Shankar now in place  -DC with Shankar in place  -TOV in rehab

## 2018-09-07 NOTE — PROGRESS NOTE ADULT - PROBLEM SELECTOR PLAN 10
Problem: Transition of care performed with sharing of clinical summary.  Plan: 1) PCP Contacted on Admission: (Y/N) --> Name & Phone #:  Dr Russ Alvarez [phone] or [phone]  2) Date of Contact with PCP:  3) PCP Contacted at Discharge: (Y/N, N/A)  4) Summary of Handoff Given to PCP:   5) Post-Discharge Appointment Date and Location: Problem: Transition of care performed with sharing of clinical summary.  Plan: 1) PCP Contacted on Admission: (Y/N) --  Dr Russ Alvarez [phone] or [phone]  09/07/2018  3) PCP Contacted at Discharge: Yes

## 2018-09-08 PROCEDURE — 99233 SBSQ HOSP IP/OBS HIGH 50: CPT | Mod: GC

## 2018-09-08 RX ORDER — ONDANSETRON 8 MG/1
4 TABLET, FILM COATED ORAL ONCE
Qty: 0 | Refills: 0 | Status: COMPLETED | OUTPATIENT
Start: 2018-09-08 | End: 2018-09-08

## 2018-09-08 RX ADMIN — LISINOPRIL 40 MILLIGRAM(S): 2.5 TABLET ORAL at 14:24

## 2018-09-08 RX ADMIN — SENNA PLUS 2 TABLET(S): 8.6 TABLET ORAL at 21:54

## 2018-09-08 RX ADMIN — ESCITALOPRAM OXALATE 5 MILLIGRAM(S): 10 TABLET, FILM COATED ORAL at 12:14

## 2018-09-08 RX ADMIN — Medication 650 MILLIGRAM(S): at 10:12

## 2018-09-08 RX ADMIN — AMLODIPINE BESYLATE 5 MILLIGRAM(S): 2.5 TABLET ORAL at 07:34

## 2018-09-08 RX ADMIN — MIRTAZAPINE 15 MILLIGRAM(S): 45 TABLET, ORALLY DISINTEGRATING ORAL at 12:14

## 2018-09-08 RX ADMIN — Medication 25 MICROGRAM(S): at 07:34

## 2018-09-08 RX ADMIN — HEPARIN SODIUM 5000 UNIT(S): 5000 INJECTION INTRAVENOUS; SUBCUTANEOUS at 07:34

## 2018-09-08 RX ADMIN — HEPARIN SODIUM 5000 UNIT(S): 5000 INJECTION INTRAVENOUS; SUBCUTANEOUS at 17:41

## 2018-09-08 RX ADMIN — Medication 650 MILLIGRAM(S): at 09:18

## 2018-09-08 RX ADMIN — PANTOPRAZOLE SODIUM 40 MILLIGRAM(S): 20 TABLET, DELAYED RELEASE ORAL at 07:34

## 2018-09-08 RX ADMIN — POLYETHYLENE GLYCOL 3350 17 GRAM(S): 17 POWDER, FOR SOLUTION ORAL at 12:14

## 2018-09-08 RX ADMIN — ONDANSETRON 4 MILLIGRAM(S): 8 TABLET, FILM COATED ORAL at 14:34

## 2018-09-08 NOTE — CONSULT NOTE ADULT - ASSESSMENT
Pt is a 89 yo female, admitted to [facility] with a fall at home.  She had a aguillon placed on admission and TOV was done at 7pm, Pt failed TOV at 3 am.  Urology consulted for a difficult aguillon after 6 prior attempts.  An 18 fr coude catheter was placed, there was difficulty visualizing the meatus, 200cc yellow urine drained and was still draining.

## 2018-09-08 NOTE — PROGRESS NOTE ADULT - SUBJECTIVE AND OBJECTIVE BOX
SEKOU AJ 89y Female    Interval HPI:  Aguillon was removed yesterday and trial of void, she failed TOV and retained >600cc of urine and Aguillon was replaced. Was manually disimpacted yesterday had bedside abdominal x-ray that was unremarkable except for colonic stool. Tap water enema performed. Scheduled for DC on monday 2/2 to outpatient urology follow-up for aguillon removal. Patient was at baseline mental status today, and not complaining of any abdominal or chest pain.      Patient seen and examined at bedside:    T(C): 36.6 (09-08-18 @ 05:49), Max: 36.8 (09-07-18 @ 12:40)  HR: 58 (09-08-18 @ 05:49) (52 - 65)  BP: 148/75 (09-08-18 @ 05:49) (134/63 - 148/75)  RR: 15 (09-08-18 @ 05:49) (15 - 16)  SpO2: 94% (09-08-18 @ 05:49) (93% - 96%)    Review of systems negative except as mentioned above    acetaminophen   Tablet. 650 milliGRAM(s) Oral every 6 hours PRN  amLODIPine   Tablet 5 milliGRAM(s) Oral every 24 hours  escitalopram 5 milliGRAM(s) Oral daily  heparin  Injectable 5000 Unit(s) SubCutaneous every 12 hours  levothyroxine 25 MICROGram(s) Oral daily  lisinopril 40 milliGRAM(s) Oral every 24 hours  mirtazapine 15 milliGRAM(s) Oral daily  pantoprazole    Tablet 40 milliGRAM(s) Oral before breakfast  polyethylene glycol 3350 17 Gram(s) Oral daily  QUEtiapine 25 milliGRAM(s) Oral at bedtime PRN  senna 2 Tablet(s) Oral at bedtime      Physical Exam:    GENERAL: Frail, lying in bed in an improved mood from yesterday, NAD  HEENT: NC/AT, MMM, PERRL  NECK: Supple, no LAD  RESPIRATORY: CTAB, adequate depth and effort  CV: S1S2 appreciated, RRR, no m/r/g  GI: Soft, NT/ND, normal active bowel sounds  EXT: Right arm in sling, 2+ pulses in upper and lower ext B/L, no cyanosis, no edema  NEURO: AOx2 (only oriented to person and place - hospital), cn ii - xii intact , 4/5 str all 4 ext. sensation intact bilaterally  MSK: Decreased bulk and tone, NROM            Labs:                        9.8    6.7   )-----------( 234      ( 07 Sep 2018 07:35 )             30.0     09-07    137  |  102  |  36<H>  ----------------------------<  95  4.6   |  22  |  0.91    Ca    9.0      07 Sep 2018 07:35  Mg     2.1     09-07            CAPILLARY BLOOD GLUCOSE                Imaging: SEKOU AJ 89y Female    Interval HPI:  Aguillon was removed yesterday and trial of void, she failed TOV and retained >600cc of urine and Aguillon was replaced. Was manually disimpacted yesterday had bedside abdominal x-ray that was unremarkable except for colonic stool. Tap water enema performed. Scheduled for DC on monday 2/2 to outpatient urology follow-up for aguillon removal. Patient was at baseline mental status today, and not complaining of any abdominal or chest pain.      Patient seen and examined at bedside:    T(C): 36.6 (09-08-18 @ 05:49), Max: 36.8 (09-07-18 @ 12:40)  HR: 58 (09-08-18 @ 05:49) (52 - 65)  BP: 148/75 (09-08-18 @ 05:49) (134/63 - 148/75)  RR: 15 (09-08-18 @ 05:49) (15 - 16)  SpO2: 94% (09-08-18 @ 05:49) (93% - 96%)    Review of systems negative except as mentioned above    acetaminophen   Tablet. 650 milliGRAM(s) Oral every 6 hours PRN  amLODIPine   Tablet 5 milliGRAM(s) Oral every 24 hours  escitalopram 5 milliGRAM(s) Oral daily  heparin  Injectable 5000 Unit(s) SubCutaneous every 12 hours  levothyroxine 25 MICROGram(s) Oral daily  lisinopril 40 milliGRAM(s) Oral every 24 hours  mirtazapine 15 milliGRAM(s) Oral daily  pantoprazole    Tablet 40 milliGRAM(s) Oral before breakfast  polyethylene glycol 3350 17 Gram(s) Oral daily  QUEtiapine 25 milliGRAM(s) Oral at bedtime PRN  senna 2 Tablet(s) Oral at bedtime      Physical Exam:    GENERAL: Frail, lying in bed in an improved mood from yesterday, NAD  HEENT: NC/AT, MMM, PERRL  NECK: Supple, no LAD  RESPIRATORY: CTAB, adequate depth and effort  CV: S1S2 appreciated, RRR, no m/r/g  GI: Soft, NT/ND, normal active bowel sounds  EXT: Right arm in sling, 2+ pulses in upper and lower ext B/L, no cyanosis, no edema  NEURO: AOx2 (only oriented to person and place - hospital), cn ii - xii intact , 1/5 str all 4 ext. sensation intact bilaterally, patient is debilatated and unable to ambulate  MSK: Decreased bulk and tone, NROM            Labs:                        9.8    6.7   )-----------( 234      ( 07 Sep 2018 07:35 )             30.0     09-07    137  |  102  |  36<H>  ----------------------------<  95  4.6   |  22  |  0.91    Ca    9.0      07 Sep 2018 07:35  Mg     2.1     09-07            CAPILLARY BLOOD GLUCOSE                Imaging:

## 2018-09-08 NOTE — CONSULT NOTE ADULT - PROBLEM SELECTOR RECOMMENDATION 9
- Continue aguillon for now  -Would consider antibiotics with multiple attempts at aguillon placement and manipulation  -Plan per primary team - Continue aguillon for now  -Would consider antibiotics with multiple attempts at aguillon placement and manipulation  -TOV per primary team

## 2018-09-08 NOTE — PROGRESS NOTE ADULT - PROBLEM SELECTOR PLAN 10
Problem: Transition of care performed with sharing of clinical summary.  Plan: 1) PCP Contacted on Admission: (Y/N) --  Dr Russ Alvarez [phone] or [phone]  09/07/2018  3) PCP Contacted at Discharge: Yes

## 2018-09-08 NOTE — CONSULT NOTE ADULT - SUBJECTIVE AND OBJECTIVE BOX
HPI:  89 year old female with PMH OA, questionable CKD, HTN, GERD, depression, dementia, and hypothyroidism admitted with fall.  Pt was found to have a UTI and was started on Ceftriaxone.  A aguillon was placed at some point during this admission.  Pt had TOV @ 7pm 9/7, Urology called at 3am for failed TOV  with 600cc in the bladder and difficult aguillon placement.      PAST MEDICAL & SURGICAL HISTORY:  Dementia  Essential hypertension  Hypothyroid      MEDICATIONS  (STANDING):  amLODIPine   Tablet 5 milliGRAM(s) Oral every 24 hours  escitalopram 5 milliGRAM(s) Oral daily  heparin  Injectable 5000 Unit(s) SubCutaneous every 12 hours  levothyroxine 25 MICROGram(s) Oral daily  lisinopril 40 milliGRAM(s) Oral every 24 hours  mirtazapine 15 milliGRAM(s) Oral daily  pantoprazole    Tablet 40 milliGRAM(s) Oral before breakfast  polyethylene glycol 3350 17 Gram(s) Oral daily  senna 2 Tablet(s) Oral at bedtime    MEDICATIONS  (PRN):  acetaminophen   Tablet. 650 milliGRAM(s) Oral every 6 hours PRN Severe Pain (7 - 10)  QUEtiapine 25 milliGRAM(s) Oral at bedtime PRN Agitation      Allergies    No Known Allergies    Intolerances        SOCIAL HISTORY:    FAMILY HISTORY:      Vital Signs Last 24 Hrs  T(C): 36.6 (08 Sep 2018 05:49), Max: 36.8 (07 Sep 2018 12:40)  T(F): 97.8 (08 Sep 2018 05:49), Max: 98.2 (07 Sep 2018 12:40)  HR: 58 (08 Sep 2018 05:49) (51 - 61)  BP: 148/75 (08 Sep 2018 05:49) (134/63 - 169/76)  BP(mean): --  RR: 15 (08 Sep 2018 05:49) (15 - 16)  SpO2: 94% (08 Sep 2018 05:49) (93% - 95%)    On PE:  General:  Abdomen:    :    EXT:    LABS:                        9.8    6.7   )-----------( 234      ( 07 Sep 2018 07:35 )             30.0     09-07    137  |  102  |  36<H>  ----------------------------<  95  4.6   |  22  |  0.91    Ca    9.0      07 Sep 2018 07:35  Mg     2.1     09-07    PE  Gen: Awake and alert, NAD  Abd: soft, nontender, nondistended  : atrophic vagina, urethral meatus very difficult to visualize          RADIOLOGY & ADDITIONAL STUDIES: HPI:  89 year old female with PMH OA, questionable CKD, HTN, GERD, depression, dementia, and hypothyroidism admitted with fall.  Pt was found to have a UTI and was started on Ceftriaxone.  A aguillon was placed at some point during this admission.  Pt had TOV @ 7pm 9/7, Urology called at 3am for failed TOV  with 600cc in the bladder and difficult aguillon placement.      PAST MEDICAL & SURGICAL HISTORY:  Dementia  Essential hypertension  Hypothyroid      MEDICATIONS  (STANDING):  amLODIPine   Tablet 5 milliGRAM(s) Oral every 24 hours  escitalopram 5 milliGRAM(s) Oral daily  heparin  Injectable 5000 Unit(s) SubCutaneous every 12 hours  levothyroxine 25 MICROGram(s) Oral daily  lisinopril 40 milliGRAM(s) Oral every 24 hours  mirtazapine 15 milliGRAM(s) Oral daily  pantoprazole    Tablet 40 milliGRAM(s) Oral before breakfast  polyethylene glycol 3350 17 Gram(s) Oral daily  senna 2 Tablet(s) Oral at bedtime    MEDICATIONS  (PRN):  acetaminophen   Tablet. 650 milliGRAM(s) Oral every 6 hours PRN Severe Pain (7 - 10)  QUEtiapine 25 milliGRAM(s) Oral at bedtime PRN Agitation      Allergies    No Known Allergies    Intolerances        SOCIAL HISTORY:    FAMILY HISTORY:      Vital Signs Last 24 Hrs  T(C): 36.6 (08 Sep 2018 05:49), Max: 36.8 (07 Sep 2018 12:40)  T(F): 97.8 (08 Sep 2018 05:49), Max: 98.2 (07 Sep 2018 12:40)  HR: 58 (08 Sep 2018 05:49) (51 - 61)  BP: 148/75 (08 Sep 2018 05:49) (134/63 - 169/76)  BP(mean): --  RR: 15 (08 Sep 2018 05:49) (15 - 16)  SpO2: 94% (08 Sep 2018 05:49) (93% - 95%)                        9.8    6.7   )-----------( 234      ( 07 Sep 2018 07:35 )             30.0     09-07    137  |  102  |  36<H>  ----------------------------<  95  4.6   |  22  |  0.91    Ca    9.0      07 Sep 2018 07:35  Mg     2.1     09-07    PE  Gen: Awake and alert, NAD  Abd: soft, nontender, nondistended  : atrophic vagina, urethral meatus very difficult to visualize          RADIOLOGY & ADDITIONAL STUDIES:

## 2018-09-08 NOTE — PROGRESS NOTE ADULT - PROBLEM SELECTOR PLAN 1
-Failed (first bladder scan revealed 900cc and second bladder scan 6 hours later revealed greater than 400cc of urine). TOV, straight catheterized and urine cx collected, Shankar now in place  -DC with Shankar in place

## 2018-09-09 DIAGNOSIS — S42.036D: ICD-10-CM

## 2018-09-09 PROCEDURE — 99233 SBSQ HOSP IP/OBS HIGH 50: CPT | Mod: GC

## 2018-09-09 RX ORDER — LISINOPRIL 2.5 MG/1
20 TABLET ORAL DAILY
Qty: 0 | Refills: 0 | Status: DISCONTINUED | OUTPATIENT
Start: 2018-09-09 | End: 2018-09-09

## 2018-09-09 RX ORDER — DOCUSATE SODIUM 100 MG
100 CAPSULE ORAL
Qty: 0 | Refills: 0 | Status: DISCONTINUED | OUTPATIENT
Start: 2018-09-09 | End: 2018-09-12

## 2018-09-09 RX ORDER — AMLODIPINE BESYLATE 2.5 MG/1
2.5 TABLET ORAL DAILY
Qty: 0 | Refills: 0 | Status: DISCONTINUED | OUTPATIENT
Start: 2018-09-09 | End: 2018-09-12

## 2018-09-09 RX ORDER — SIMETHICONE 80 MG/1
80 TABLET, CHEWABLE ORAL ONCE
Qty: 0 | Refills: 0 | Status: COMPLETED | OUTPATIENT
Start: 2018-09-09 | End: 2018-09-09

## 2018-09-09 RX ORDER — LISINOPRIL 2.5 MG/1
40 TABLET ORAL DAILY
Qty: 0 | Refills: 0 | Status: DISCONTINUED | OUTPATIENT
Start: 2018-09-09 | End: 2018-09-12

## 2018-09-09 RX ADMIN — AMLODIPINE BESYLATE 2.5 MILLIGRAM(S): 2.5 TABLET ORAL at 11:37

## 2018-09-09 RX ADMIN — QUETIAPINE FUMARATE 25 MILLIGRAM(S): 200 TABLET, FILM COATED ORAL at 22:20

## 2018-09-09 RX ADMIN — POLYETHYLENE GLYCOL 3350 17 GRAM(S): 17 POWDER, FOR SOLUTION ORAL at 11:37

## 2018-09-09 RX ADMIN — Medication 10 MILLIGRAM(S): at 22:20

## 2018-09-09 RX ADMIN — Medication 650 MILLIGRAM(S): at 06:59

## 2018-09-09 RX ADMIN — HEPARIN SODIUM 5000 UNIT(S): 5000 INJECTION INTRAVENOUS; SUBCUTANEOUS at 06:32

## 2018-09-09 RX ADMIN — HEPARIN SODIUM 5000 UNIT(S): 5000 INJECTION INTRAVENOUS; SUBCUTANEOUS at 18:32

## 2018-09-09 RX ADMIN — PANTOPRAZOLE SODIUM 40 MILLIGRAM(S): 20 TABLET, DELAYED RELEASE ORAL at 06:31

## 2018-09-09 RX ADMIN — MIRTAZAPINE 15 MILLIGRAM(S): 45 TABLET, ORALLY DISINTEGRATING ORAL at 11:37

## 2018-09-09 RX ADMIN — Medication 25 MICROGRAM(S): at 06:31

## 2018-09-09 RX ADMIN — SIMETHICONE 80 MILLIGRAM(S): 80 TABLET, CHEWABLE ORAL at 18:32

## 2018-09-09 RX ADMIN — SENNA PLUS 2 TABLET(S): 8.6 TABLET ORAL at 22:20

## 2018-09-09 RX ADMIN — ESCITALOPRAM OXALATE 5 MILLIGRAM(S): 10 TABLET, FILM COATED ORAL at 11:37

## 2018-09-09 RX ADMIN — LISINOPRIL 40 MILLIGRAM(S): 2.5 TABLET ORAL at 16:56

## 2018-09-09 NOTE — PROGRESS NOTE ADULT - PROBLEM SELECTOR PLAN 2
mechanical fall  PT recommended [facility] , but denied by insurance as pt is demented and has 24hr home care.   plan is to dc home w/ 24hr care

## 2018-09-09 NOTE — PROGRESS NOTE ADULT - SUBJECTIVE AND OBJECTIVE BOX
Patient is a 89y old  Female who presents with a chief complaint of Fall (08 Sep 2018 12:33)      INTERVAL HPI/OVERNIGHT EVENTS:    asking for "help" getting out of bed  right shoulder pain is improved  remains confused , but able to intermittently answer simple questions       ROS  (- ) headache  ( -  )fevers/chills  ( - ) dyspnea  (  - ) cough  (  - ) chest pain  (  - ) palpatations  ( - ) dizziness/lightheadedness  (  - ) nausea/vomiting  (  - ) abd pain  (  - ) diarrhea  (  - ) melena  (  - ) hematochezia  ( - ) back pain  ( + ) tolerating POs  ( + ) BM  ROS: 12 point review of systems otherwise negative              MEDICATIONS  (STANDING):  amLODIPine   Tablet 2.5 milliGRAM(s) Oral daily  escitalopram 5 milliGRAM(s) Oral daily  heparin  Injectable 5000 Unit(s) SubCutaneous every 12 hours  levothyroxine 25 MICROGram(s) Oral daily  lisinopril 40 milliGRAM(s) Oral daily  mirtazapine 15 milliGRAM(s) Oral daily  pantoprazole    Tablet 40 milliGRAM(s) Oral before breakfast  polyethylene glycol 3350 17 Gram(s) Oral daily  senna 2 Tablet(s) Oral at bedtime    MEDICATIONS  (PRN):  acetaminophen   Tablet. 650 milliGRAM(s) Oral every 6 hours PRN Severe Pain (7 - 10)  QUEtiapine 25 milliGRAM(s) Oral at bedtime PRN Agitation      Allergies    No Known Allergies    Intolerances          Vital Signs Last 24 Hrs  T(C): 36.7 (09 Sep 2018 14:59), Max: 36.7 (08 Sep 2018 20:35)  T(F): 98 (09 Sep 2018 14:59), Max: 98.1 (08 Sep 2018 20:35)  HR: 55 (09 Sep 2018 14:59) (50 - 55)  BP: 142/60 (09 Sep 2018 14:59) (99/55 - 142/60)  BP(mean): --  RR: 16 (09 Sep 2018 14:59) (15 - 16)  SpO2: 96% (09 Sep 2018 14:59) (94% - 96%)  CAPILLARY BLOOD GLUCOSE          09-08 @ 07:01  -  09-09 @ 07:00  --------------------------------------------------------  IN: 0 mL / OUT: 600 mL / NET: -600 mL    09-09 @ 07:01  - 09-09 @ 17:04  --------------------------------------------------------  IN: 0 mL / OUT: 400 mL / NET: -400 mL        Physical Exam:    Daily     Daily   General:  Well appearing   HEENT:  Nonicteric, PERRLA, oral pharynx w/o erythema/exudate,  neck supple  CV:  [id number] , RRR,  no JVD  Lungs:  CTA B/L, no wheezes, rales, rhonchi  Abdomen:  positive BS, Soft, non-tender, non distended, no hepatosplenomegaly  Extremities: right upper ext in sling,   right shoulder w/ ecchymosis laterally and posteriorly. 2+ DP/radial pulses b/l, no cyanosis,    Back: no cva or midline tenderness  :   aguillon w/ yellow clear urine  Neuro:  AAOxperson,  CN II-XII grossly intact, 4/5 str all 4 ext, sensation intact, (-) dysmetria b/l    No Restraints    LABS:                  RADIOLOGY & ADDITIONAL TESTS:

## 2018-09-09 NOTE — PROGRESS NOTE ADULT - PROBLEM SELECTOR PROBLEM 6
Closed nondisplaced fracture of acromial end of clavicle with routine healing, unspecified laterality, subsequent encounter

## 2018-09-10 PROCEDURE — 99233 SBSQ HOSP IP/OBS HIGH 50: CPT | Mod: GC

## 2018-09-10 RX ADMIN — Medication 100 MILLIGRAM(S): at 07:16

## 2018-09-10 RX ADMIN — POLYETHYLENE GLYCOL 3350 17 GRAM(S): 17 POWDER, FOR SOLUTION ORAL at 11:03

## 2018-09-10 RX ADMIN — PANTOPRAZOLE SODIUM 40 MILLIGRAM(S): 20 TABLET, DELAYED RELEASE ORAL at 07:15

## 2018-09-10 RX ADMIN — Medication 25 MICROGRAM(S): at 07:16

## 2018-09-10 RX ADMIN — Medication 10 MILLIGRAM(S): at 11:03

## 2018-09-10 RX ADMIN — SENNA PLUS 2 TABLET(S): 8.6 TABLET ORAL at 21:50

## 2018-09-10 RX ADMIN — HEPARIN SODIUM 5000 UNIT(S): 5000 INJECTION INTRAVENOUS; SUBCUTANEOUS at 07:15

## 2018-09-10 RX ADMIN — LISINOPRIL 40 MILLIGRAM(S): 2.5 TABLET ORAL at 07:15

## 2018-09-10 RX ADMIN — ESCITALOPRAM OXALATE 5 MILLIGRAM(S): 10 TABLET, FILM COATED ORAL at 11:02

## 2018-09-10 RX ADMIN — HEPARIN SODIUM 5000 UNIT(S): 5000 INJECTION INTRAVENOUS; SUBCUTANEOUS at 17:22

## 2018-09-10 RX ADMIN — AMLODIPINE BESYLATE 2.5 MILLIGRAM(S): 2.5 TABLET ORAL at 07:15

## 2018-09-10 RX ADMIN — Medication 100 MILLIGRAM(S): at 17:22

## 2018-09-10 RX ADMIN — MIRTAZAPINE 15 MILLIGRAM(S): 45 TABLET, ORALLY DISINTEGRATING ORAL at 11:03

## 2018-09-10 NOTE — PROGRESS NOTE ADULT - PROBLEM SELECTOR PLAN 10
Problem: Transition of care performed with sharing of clinical summary.  Plan: 1) PCP Contacted on Admission: (Y/N) --  Dr Russ Alvarez [phone] or [phone]  09/07/2018  3) PCP Contacted at Discharge: Yes

## 2018-09-10 NOTE — PROGRESS NOTE ADULT - ATTENDING COMMENTS
DX  FALL - suspected mechanical in nature. plan on dc'ing home w/ 24 hr home care.   SINUS BRADYCARDIA - asx.  hr 50-60s . monitor.   UTI -S/P  ceftriaxone x 3 days  URINARY RETENTION- Failed trial of void. cont aguillon . mobilize. needs urology f/u  RIGHT CLAVICULAR FX/AC DISLOCATION - non operative per ortho. sling. OT/PT f/u ortho as outpt  HYPOTHYROID - cont levothyroxine. tsh wnl .   DEMENTIA - mental status appears to be at baseline . needs 24/7 supervision.
DX  FALL - suspected mechanical in nature. pt would be better served at [facility], although was denied by insurance. expidited appeal is being sent.   SINUS BRADYCARDIA - asx.  hr 50-60s . monitor.   UTI -S/P  ceftriaxone x 3 days  URINARY RETENTION- Failed trial of void. cont aguillon . mobilize. needs urology f/u  RIGHT CLAVICULAR FX/AC DISLOCATION - non operative per ortho. sling. OT/PT f/u ortho as outpt  HYPOTHYROID - cont levothyroxine. tsh wnl .   DEMENTIA - mental status appears to be at baseline . needs 24/7 supervision  FUNCTIONAL QUADRIPLEGIA - supportive care
DX  FALL - suspected mechanical in nature. seen by PT, recommending [facility].   SINUS BRADYCARDIA - asx.  hr 50-60s . monitor.   UTI -S/P  ceftriaxone x 3 days  URINARY RETENTION- Failed trial of void. cont aguillon . mobilize. repeat TOV in Rehab  RIGHT CLAVICULAR FX/AC DISLOCATION - non operative per ortho. sling. OT/PT f/u ortho as outpt  HYPOTHYROID - cont levothyroxine. tsh wnl .   DEMENTIA - mental status appears to be at baseline . needs 24/7 supervision
DX  FALL - suspected mechanical in nature. seen by PT, recommending [facility].   SINUS BRADYCARDIA - hr 50-60s . monitor.   UTI -S/P  ceftriaxone x 3 days  URINARY RETENTION- Failed trial of void. cont aguillon . mobilize. repeat TOV in Rehab  RIGHT CLAVICULAR FX/AC DISLOCATION - non operative per ortho. sling. OT/PT  HYPOTHYROID - cont levothyroxine. tsh wnl .   DEMENTIA - mental status appears to be at baseline
DX  FALL - suspected mechanical in nature. seen by PT, recommending [facility].   SINUS BRADYCARDIA - hr 50-60s . monitor.   UTI - cont ceftriaxone x 3 days, f/u ucx  RIGHT CLAVICULAR FX/AC DISLOCATION - non operative per ortho. sling. OT/PT  HYPOTHYROID - cont levothyroxine. tsh wnl

## 2018-09-10 NOTE — PROGRESS NOTE ADULT - SUBJECTIVE AND OBJECTIVE BOX
Physical Medicine and Rehabilitation Progress Note:    Patient is a 89y old  Female who presents with a chief complaint of Fall (09 Sep 2018 17:03)      HPI:  89 year old female with PMH OA, questionable CKD, HTN, GERD, depression, dementia, and hypothyroidism who presents after a fall.  Patient's home health aide was in the bathroom and she heard a loud sound in the bedroom.  Immediately after that the aide heard the patient crying out for help and she found her at the foot of the bed bleeding from her scalp.  In the ED, vital signs were BP 97/68, HR 52, RR 16, temp 97.5 F and saturating 95% on room air. Patient's labs were unremarkable.  CT head without acute pathology.  She was given IV fluids and tylenol for pain. (01 Sep 2018 12:12)                Vital Signs Last 24 Hrs  T(C): 36.6 (10 Sep 2018 11:17), Max: 36.7 (09 Sep 2018 14:59)  T(F): 97.9 (10 Sep 2018 11:17), Max: 98 (09 Sep 2018 14:59)  HR: 51 (10 Sep 2018 11:17) (50 - 55)  BP: 115/59 (10 Sep 2018 11:17) (115/59 - 142/60)  BP(mean): --  RR: 15 (10 Sep 2018 11:17) (15 - 16)  SpO2: 96% (10 Sep 2018 11:17) (96% - 96%)    MEDICATIONS  (STANDING):  amLODIPine   Tablet 2.5 milliGRAM(s) Oral daily  docusate sodium 100 milliGRAM(s) Oral two times a day  escitalopram 5 milliGRAM(s) Oral daily  heparin  Injectable 5000 Unit(s) SubCutaneous every 12 hours  levothyroxine 25 MICROGram(s) Oral daily  lisinopril 40 milliGRAM(s) Oral daily  mirtazapine 15 milliGRAM(s) Oral daily  pantoprazole    Tablet 40 milliGRAM(s) Oral before breakfast  polyethylene glycol 3350 17 Gram(s) Oral daily  senna 2 Tablet(s) Oral at bedtime    MEDICATIONS  (PRN):  acetaminophen   Tablet. 650 milliGRAM(s) Oral every 6 hours PRN Severe Pain (7 - 10)  bisacodyl Suppository 10 milliGRAM(s) Rectal daily PRN Constipation  QUEtiapine 25 milliGRAM(s) Oral at bedtime PRN Agitation    Currently Undergoing Physical Therapy at bedside.    Functional Status Assessment: on 9/8/2018    Therapeutic Interventions    Bed Mobility  Bed Mobility Training Scooting: maximum assist (25% patient effort);  1 person assist;  verbal cues;  nonverbal cues (demo/gestures)  Bed Mobility Training Sit-to-Supine: maximum assist (25% patient effort);  verbal cues;  nonverbal cues (demo/gestures);  1 person assist  Bed Mobility Training Supine-to-Sit: dependent (less than 25% patient effort);  2 person assist  Bed Mobility Training Limitations: impaired ability to control trunk for mobility;  decreased ability to use legs for bridging/pushing;  decreased ability to use arms for pushing/pulling;  decreased ROM;  decreased strength;  impaired balance;  impaired postural control;  pain;  cognitive, decreased safety awareness    Sit-Stand Transfer Training  Transfer Training Sit-to-Stand Transfer: maximum assist (25% patient effort);  1 person assist;  full weight-bearing   HHA;  verbal cues;  nonverbal cues (demo/gestures)  Transfer Training Stand-to-Sit Transfer: maximum assist (25% patient effort);  verbal cues;  nonverbal cues (demo/gestures);  1 person assist;  full weight-bearing   HHA    Therapeutic Exercise  Therapeutic Exercise Detail: Sitting at EOB with intermittent close supervision ~8 minutes; x2 trials sit/stand             PM&R Impression: as above    Disposition Plan Recommendations: subacute rehab placement, denied by insurance, plan for d/c home, home physical therapy, 24 hr x 7 day home care services

## 2018-09-10 NOTE — CHART NOTE - NSCHARTNOTEFT_GEN_A_CORE
Before admission patient was able to ambulate without assistance. After fall and clavicular fracture patient was admitted to hospital, patient is now currently debilitated patient has marked lower bilateral extremity weakness and is unable to ambulate. Now has new onset acute urinary retention also secondary to debility. If patient is discharged to home in current state, she will have to be physically carried or assisted, increasing risk of falls and re-hositalizations. Secondly, if discharged to home with Shankar catheter and it is not removed promptly (by missing follow up appt because she cannot move) is at increased risk for severe sepsis and critical illness admission. Patient needs placement into rehab to ensure safe transition of care and prevention of unnecessary re-hospitalization. Before admission patient was able to ambulate without assistance. After fall and clavicular fracture patient was admitted to hospital, patient is now currently severely deconditioned from hospitalization patient has and is unable to ambulate. Now has new onset acute urinary retention also secondary to debility. Patient is having extreme difficulty standing as she usually requires assisted device in form of walker but due to fracture of clavicle has weakness in upper extremity precluding her from using rolling walker. If patient is discharged to home in current state, she will presumably have to be physically carried given her severely deconditioned state, increasing risk of falls and re-hospitalizations. Secondly, if discharged to home with Shankar catheter and it is not removed promptly (by missing follow up appt because she cannot ambulate). Patient would benefit significantly from placement into sub-acute rehab given her current acute clavicular fracture, patient is cooperative, goal is to heal clavicular fracture when has use of upper extremity can use walker and return to baseline level of mobility.

## 2018-09-10 NOTE — PROGRESS NOTE ADULT - SUBJECTIVE AND OBJECTIVE BOX
SEKOU AJ 89y Female    Interval HPI:  Before admission patient was able to ambulate without assistance. After fall and clavicular fracture patient was admitted to hospital, patient is now currently debilitated patient has marked lower bilateral extremity weakness and is unable to ambulate. Now has new onset acute urinary retention also secondary to debility. If patient is discharged to home in current state, she will have to be physically carried or assisted, increasing risk of falls and re-hositalizations. Secondly, if discharged to home with Shankar catheter and it is not removed promptly (by missing follow up appt because she cannot move) is at increased risk for severe sepsis and critical illness admission. Patient needs placement into rehab to ensure safe transition of care and prevention of unnecessary re-hospitalization.      Patient seen and examined at bedside:    T(C): 36.6 (09-10-18 @ 11:17), Max: 36.6 (09-09-18 @ 20:35)  HR: 51 (09-10-18 @ 11:17) (50 - 55)  BP: 115/59 (09-10-18 @ 11:17) (115/59 - 140/79)  RR: 15 (09-10-18 @ 11:17) (15 - 16)  SpO2: 96% (09-10-18 @ 11:17) (96% - 96%)    Review of systems negative except as mentioned above    acetaminophen   Tablet. 650 milliGRAM(s) Oral every 6 hours PRN  amLODIPine   Tablet 2.5 milliGRAM(s) Oral daily  bisacodyl Suppository 10 milliGRAM(s) Rectal daily PRN  docusate sodium 100 milliGRAM(s) Oral two times a day  escitalopram 5 milliGRAM(s) Oral daily  heparin  Injectable 5000 Unit(s) SubCutaneous every 12 hours  levothyroxine 25 MICROGram(s) Oral daily  lisinopril 40 milliGRAM(s) Oral daily  mirtazapine 15 milliGRAM(s) Oral daily  pantoprazole    Tablet 40 milliGRAM(s) Oral before breakfast  polyethylene glycol 3350 17 Gram(s) Oral daily  QUEtiapine 25 milliGRAM(s) Oral at bedtime PRN  senna 2 Tablet(s) Oral at bedtime      Physical Exam:    GENERAL: Frail, lying in bed in an improved mood from yesterday, NAD  HEENT: NC/AT, MMM, PERRL  NECK: Supple, no LAD  RESPIRATORY: CTAB, adequate depth and effort  CV: S1S2 appreciated, RRR, no m/r/g  GI: Soft, NT/ND, normal active bowel sounds  EXT: Right arm in sling, 2+ pulses in upper and lower ext B/L, no cyanosis, no edema  NEURO: AOx2 (only oriented to person and place - hospital), cn ii - xii intact , 1/5 str all 4 ext. sensation intact bilaterally, patient is debilatated and unable to ambulate  MSK: Decreased bulk and tone, NROM

## 2018-09-11 PROCEDURE — 99233 SBSQ HOSP IP/OBS HIGH 50: CPT | Mod: GC

## 2018-09-11 RX ADMIN — MIRTAZAPINE 15 MILLIGRAM(S): 45 TABLET, ORALLY DISINTEGRATING ORAL at 12:28

## 2018-09-11 RX ADMIN — SENNA PLUS 2 TABLET(S): 8.6 TABLET ORAL at 21:57

## 2018-09-11 RX ADMIN — ESCITALOPRAM OXALATE 5 MILLIGRAM(S): 10 TABLET, FILM COATED ORAL at 12:27

## 2018-09-11 RX ADMIN — Medication 10 MILLIGRAM(S): at 22:00

## 2018-09-11 RX ADMIN — Medication 25 MICROGRAM(S): at 05:42

## 2018-09-11 RX ADMIN — HEPARIN SODIUM 5000 UNIT(S): 5000 INJECTION INTRAVENOUS; SUBCUTANEOUS at 05:42

## 2018-09-11 RX ADMIN — QUETIAPINE FUMARATE 25 MILLIGRAM(S): 200 TABLET, FILM COATED ORAL at 22:25

## 2018-09-11 RX ADMIN — LISINOPRIL 40 MILLIGRAM(S): 2.5 TABLET ORAL at 05:42

## 2018-09-11 RX ADMIN — AMLODIPINE BESYLATE 2.5 MILLIGRAM(S): 2.5 TABLET ORAL at 05:42

## 2018-09-11 RX ADMIN — HEPARIN SODIUM 5000 UNIT(S): 5000 INJECTION INTRAVENOUS; SUBCUTANEOUS at 18:28

## 2018-09-11 RX ADMIN — Medication 650 MILLIGRAM(S): at 13:00

## 2018-09-11 RX ADMIN — POLYETHYLENE GLYCOL 3350 17 GRAM(S): 17 POWDER, FOR SOLUTION ORAL at 12:28

## 2018-09-11 RX ADMIN — Medication 100 MILLIGRAM(S): at 18:28

## 2018-09-11 RX ADMIN — PANTOPRAZOLE SODIUM 40 MILLIGRAM(S): 20 TABLET, DELAYED RELEASE ORAL at 06:38

## 2018-09-11 RX ADMIN — Medication 100 MILLIGRAM(S): at 05:46

## 2018-09-11 RX ADMIN — Medication 650 MILLIGRAM(S): at 12:27

## 2018-09-11 RX ADMIN — Medication 650 MILLIGRAM(S): at 21:56

## 2018-09-11 NOTE — PROGRESS NOTE ADULT - PROBLEM SELECTOR PLAN 10
Problem: Transition of care performed with sharing of clinical summary.  Plan: 1) PCP Contacted on Admission: (Y/N) --  Dr Russ Alvarez [phone] or [phone]  09/07/2018  3) PCP Contacted at Discharge: Yes

## 2018-09-11 NOTE — PROGRESS NOTE ADULT - PROVIDER SPECIALTY LIST ADULT
Internal Medicine
Rehab Medicine
Internal Medicine

## 2018-09-11 NOTE — PROGRESS NOTE ADULT - PROBLEM SELECTOR PROBLEM 10
Transition of care performed with sharing of clinical summary
Need for prophylactic measure
Transition of care performed with sharing of clinical summary

## 2018-09-11 NOTE — PROGRESS NOTE ADULT - PROBLEM SELECTOR PROBLEM 2
Fall
UTI (urinary tract infection)
Fall

## 2018-09-11 NOTE — CHART NOTE - NSCHARTNOTEFT_GEN_A_CORE
Admitting Diagnosis:   89 year old female with PMH OA, questionable CKD, HTN, GERD, depression, dementia, and hypothyroidism who presents after a fall with right distal clavicular fracture.       PAST MEDICAL & SURGICAL HISTORY:  Dementia  Essential hypertension  Hypothyroid      Current Nutrition Order: DASH/TLC diet     PO Intake: Good (%) [   ]  Fair (50-75%) [ X  ] Poor (<25%) [   ] intake fluctuates at per pca; pt has been sleeping majority of the day today and with minimal PO intake.     GI Issues: Denies any n/v/d/c      Pain: Pain controlled     Skin Integrity: Stage I buttocks     Labs:         CAPILLARY BLOOD GLUCOSE          Medications:  MEDICATIONS  (STANDING):  amLODIPine   Tablet 2.5 milliGRAM(s) Oral daily  docusate sodium 100 milliGRAM(s) Oral two times a day  escitalopram 5 milliGRAM(s) Oral daily  heparin  Injectable 5000 Unit(s) SubCutaneous every 12 hours  levothyroxine 25 MICROGram(s) Oral daily  lisinopril 40 milliGRAM(s) Oral daily  mirtazapine 15 milliGRAM(s) Oral daily  pantoprazole    Tablet 40 milliGRAM(s) Oral before breakfast  polyethylene glycol 3350 17 Gram(s) Oral daily  senna 2 Tablet(s) Oral at bedtime    MEDICATIONS  (PRN):  acetaminophen   Tablet. 650 milliGRAM(s) Oral every 6 hours PRN Severe Pain (7 - 10)  bisacodyl Suppository 10 milliGRAM(s) Rectal daily PRN Constipation  QUEtiapine 25 milliGRAM(s) Oral at bedtime PRN Agitation      Weight: 41.3 kg       Weight Change: No new wt to assess     Estimated energy needs:     Estimated Energy Needs (25-30 calories/kg):  · Weight  (lbs)	91 lb  · Weight (kg)	41.2 kg  · From (25cal/kg)	1030  · To (30cal/kg)	1236     Other Calculation:  · Other Calculation	Height: 60" Weight: 91lbs, IBW 100lbs+/-10%, %IBW 91%, BMI - 17.8  ABW used to calculate energy needs due to pt's current body weight within % IBW   Nutrient needs based on [facility] standards of care for repletion in older adults 2/2 underweight status, suspected malnutrition     Estimated Protein Needs (1.2-1.4 gm/kg):  · Weight  (lbs)	91  · Weight (kg)	41.2 kg  · From (1.2 g/kg)	49.44  · To (1.4 g/kg)	57.68     Estimated Fluid Needs (30-35 ml/kg):  · Weight  (lbs)	91  · Weight (kg)	41.2  · From (30 ml/kg)	1236  · To (35 ml/kg)	1442      Subjective: 89 year old female with PMH OA, questionable CKD, HTN, GERD, depression, dementia, and hypothyroidism who presents after a fall with right distal clavicular fracture. Pt tolerating diet with poor to fair PO intake. As per pca, pt needs much encouragement and sleeps through meal times. No GI distress noted. Pt would benefit from Ensure supplementation.     Previous Nutrition Diagnosis:  Inadequate oral intake r/t decreased PO intake AEB </50% of breakfast tray consumed, BMI 17.8      Active [ X  ]  Resolved [   ]    If resolved, new PES:     Goal: 1.) Pt to meet 75% of needs PO     Recommendations: Rec. Ensure Ensure TID     Education: Unable to educate; pt sleeping     Risk Level: High [  ] Moderate [   ] Low [   ]

## 2018-09-11 NOTE — PROGRESS NOTE ADULT - PROBLEM SELECTOR PROBLEM 1
Acute urinary retention
Fall
Acute urinary retention

## 2018-09-11 NOTE — PROGRESS NOTE ADULT - SUBJECTIVE AND OBJECTIVE BOX
Physical Medicine and Rehabilitation Progress Note:    Patient is a 89y old  Female who presents with a chief complaint of Fall (10 Sep 2018 16:27)      HPI:  89 year old female with PMH OA, questionable CKD, HTN, GERD, depression, dementia, and hypothyroidism who presents after a fall.  Patient's home health aide was in the bathroom and she heard a loud sound in the bedroom.  Immediately after that the aide heard the patient crying out for help and she found her at the foot of the bed bleeding from her scalp.  In the ED, vital signs were BP 97/68, HR 52, RR 16, temp 97.5 F and saturating 95% on room air. Patient's labs were unremarkable.  CT head without acute pathology.  She was given IV fluids and tylenol for pain. (01 Sep 2018 12:12)                Vital Signs Last 24 Hrs  T(C): 36.7 (11 Sep 2018 12:25), Max: 37 (10 Sep 2018 21:21)  T(F): 98 (11 Sep 2018 12:25), Max: 98.6 (10 Sep 2018 21:21)  HR: 47 (11 Sep 2018 12:25) (47 - 63)  BP: 151/72 (11 Sep 2018 12:25) (136/80 - 158/73)  BP(mean): --  RR: 16 (11 Sep 2018 12:25) (15 - 16)  SpO2: 96% (11 Sep 2018 12:25) (96% - 98%)    MEDICATIONS  (STANDING):  amLODIPine   Tablet 2.5 milliGRAM(s) Oral daily  docusate sodium 100 milliGRAM(s) Oral two times a day  escitalopram 5 milliGRAM(s) Oral daily  heparin  Injectable 5000 Unit(s) SubCutaneous every 12 hours  levothyroxine 25 MICROGram(s) Oral daily  lisinopril 40 milliGRAM(s) Oral daily  mirtazapine 15 milliGRAM(s) Oral daily  pantoprazole    Tablet 40 milliGRAM(s) Oral before breakfast  polyethylene glycol 3350 17 Gram(s) Oral daily  senna 2 Tablet(s) Oral at bedtime    MEDICATIONS  (PRN):  acetaminophen   Tablet. 650 milliGRAM(s) Oral every 6 hours PRN Severe Pain (7 - 10)  bisacodyl Suppository 10 milliGRAM(s) Rectal daily PRN Constipation  QUEtiapine 25 milliGRAM(s) Oral at bedtime PRN Agitation    Currently Undergoing Physical Therapy at bedside.    Functional Status Assessment:    Pain Assessment/Number Scale (0-10) Adult  Presence of Pain: complains of pain/discomfort  Pain Body Location: coccyx   Right:   shoulder  Radiation To Radiation To: no numerical value provided     Therapeutic Interventions      Bed Mobility  Bed Mobility Training Rolling/Turning: maximum assist (25% patient effort);  moderate assist (50% patient effort);  2 person assist;  nonverbal cues (demo/gestures);  verbal cues;  bed rails  Bed Mobility Training Sit-to-Supine: maximum assist (25% patient effort);  2 person assist;  nonverbal cues (demo/gestures);  verbal cues;  bed rails  Bed Mobility Training Supine-to-Sit: moderate assist (50% patient effort);  maximum assist (25% patient effort);  2 person assist;  nonverbal cues (demo/gestures);  verbal cues;  bed rails  Bed Mobility Training Limitations: decreased ability to use arms for pushing/pulling;  decreased ability to use legs for bridging/pushing;  impaired ability to control trunk for mobility;  decreased strength;  impaired balance;  impaired postural control;  pain;  cognitive, decreased safety awareness    Sit-Stand Transfer Training  Transfer Training Sit-to-Stand Transfer: maximum assist (25% patient effort);  2 person assist;  verbal cues;  nonverbal cues (demo/gestures);  LUE HHA   Transfer Training Stand-to-Sit Transfer: maximum assist (25% patient effort);  2 person assist;  nonverbal cues (demo/gestures);  verbal cues;  LUE HHA   Sit-to-Stand Transfer Training Transfer Safety Analysis: decreased balance;  decreased cognition;  decreased step length;  decreased weight-shifting ability;  decreased strength;  impaired balance;  impaired postural control;  pain;  cognitive, decreased safety awareness;  LUE HHA     Therapeutic Exercise  Therapeutic Exercise Detail: dangling on EOB x5 min w/ mod/maxA for trunk support at times, Kashif other times weight shifting in standing           PM&R Impression: as above    Disposition Plan Recommendations: subacute rehab placement

## 2018-09-11 NOTE — PROGRESS NOTE ADULT - PROBLEM SELECTOR PROBLEM 3
HTN (hypertension)
UTI (urinary tract infection)

## 2018-09-11 NOTE — PROGRESS NOTE ADULT - ASSESSMENT
89 year old female with PMH OA, questionable CKD, HTN, GERD, depression, dementia, and hypothyroidism who presents after a fall with right distal clavicular fracture.     Problem/Plan - 1:  ·  Problem: Acute urinary retention.  Plan: -Failed (first bladder scan revealed 900cc and second bladder scan 6 hours later revealed greater than 400cc of urine). TOV, straight catheterized and urine cx collected, Shankar now in place  -DC with Shankar in place.     Problem/Plan - 2:  ·  Problem: Fall.  Plan: -Patient presents after fall at home, likely mechanical as patient as trying to get up out of bed.  Patient with dementia and poor historian and can't contribute to events prior to fall.  Home health aide heard patient's voice immediately after fall so less likely syncope.  No evidence of cardiac event, possibly vasovagal vs dehydration.  -Patient already received IVF and will encourage PO intake.  -EKG with no ischemic events  -CT head and spine without acute pathology  -Xray right shoulder demonstrated distal clavicular fracture with separation of right AC joint.   -CXR and pelvic xray negative for fracture  -L shoulder w rotator cuff tear, no fx  -placed in right arm sling  -ROM exercises in 2-4 weeks  -Tylenol PRN for pain      Problem/Plan - 3:  ·  Problem: UTI (urinary tract infection).  Plan: -Patient with foul smelling, cloudy urine and + UA  -s/p ceftriaxone for a total of 3 days, completed course.
89 year old female with PMH OA, questionable CKD, HTN, GERD, depression, dementia, and hypothyroidism who presents after a fall with right distal clavicular fracture.     Problem/Plan - 1:  ·  Problem: Acute urinary retention.  Plan: -Retained 2L yesterday, aguillon catheter was placed has collected 1025cc as of now  -TOV today.     Problem/Plan - 2:  ·  Problem: Fall.  Plan: -Patient presents after fall at home, likely mechanical as patient as trying to get up out of bed.  Patient with dementia and poor historian and can't contribute to events prior to fall.  Home health aide heard patient's voice immediately after fall so less likely syncope.  No evidence of cardiac event, possibly vasovagal vs dehydration.  -Patient already received IVF and will encourage PO intake.  -EKG with no ischemic events  -CT head and spine without acute pathology  -Xray right shoulder demonstrated distal clavicular fracture with separation of right AC joint.   -CXR and pelvic xray negative for fracture  -L shoulder w rotator cuff tear, no fx  -placed in right arm sling  -ROM exercises in 2-4 weeks  -Tylenol PRN for pain    Problem/Plan - 3:  ·  Problem: UTI (urinary tract infection).  Plan: -Patient with foul smelling, cloudy urine and + UA  -s/p ceftriaxone for a total of 3 days, completed course.
89 year old female with PMH OA, questionable CKD, HTN, GERD, depression, dementia, and hypothyroidism who presents after a fall with right distal clavicular fracture.
89 year old female with PMH OA, questionable CKD, HTN, GERD, depression, dementia, and hypothyroidism who presents after a fall with right distal clavicular fracture.     Problem/Plan - 1:  ·  Problem: Acute urinary retention.  Plan: -Failed (first bladder scan revealed 900cc and second bladder scan 6 hours later revealed greater than 400cc of urine). TOV, straight catheterized and urine cx collected, Shankar now in place  -DC with Shankar in place.     Problem/Plan - 2:  ·  Problem: Fall.  Plan: -Patient presents after fall at home, likely mechanical as patient as trying to get up out of bed.  Patient with dementia and poor historian and can't contribute to events prior to fall.  Home health aide heard patient's voice immediately after fall so less likely syncope.  No evidence of cardiac event, possibly vasovagal vs dehydration.  -Patient already received IVF and will encourage PO intake.  -EKG with no ischemic events  -CT head and spine without acute pathology  -Xray right shoulder demonstrated distal clavicular fracture with separation of right AC joint.   -CXR and pelvic xray negative for fracture  -L shoulder w rotator cuff tear, no fx  -placed in right arm sling  -ROM exercises in 2-4 weeks  -Tylenol PRN for pain       Problem/Plan - 3:  ·  Problem: UTI (urinary tract infection).  Plan: -Patient with foul smelling, cloudy urine and + UA  -s/p ceftriaxone for a total of 3 days, completed course.
89 year old female with PMH OA, questionable CKD, HTN, GERD, depression, dementia, and hypothyroidism who presents after a fall with right distal clavicular fracture.     Problem/Plan - 1:  ·  Problem: Acute urinary retention.  Plan: will need to be dc'd with aguillon cath  needs outpt urology follow up.     Problem/Plan - 2:  ·  Problem: Fall.  Plan: mechanical fall  PT recommended [facility] , but denied by insurance as pt is demented and has 24hr home care.   plan is to dc home w/ 24hr care.     Problem/Plan - 3:  ·  Problem: UTI (urinary tract infection).  Plan: -s/p ceftriaxone for a total of 3 days.     Problem/Plan - 4:  ·  Problem: HTN (hypertension).  Plan: -cont lisinopril + amlodipine.     Problem/Plan - 5:  ·  Problem: GERD (gastroesophageal reflux disease).  Plan: -c/w esomeprazole 40mg daily.     Problem/Plan - 6:  Problem: Closed nondisplaced fracture of acromial end of clavicle with routine healing, unspecified laterality, subsequent encounter. Plan: needs ortho follow up   sling right arm.
89 year old female with PMH OA, questionable CKD, HTN, GERD, depression, dementia, and hypothyroidism who presents after a fall.
89 year old female with PMH OA, questionable CKD, HTN, GERD, depression, dementia, and hypothyroidism who presents after a fall with right distal clavicular fracture.

## 2018-09-11 NOTE — PROGRESS NOTE ADULT - PROBLEM SELECTOR PROBLEM 5
GERD (gastroesophageal reflux disease)
Hypothyroidism
GERD (gastroesophageal reflux disease)

## 2018-09-11 NOTE — PROGRESS NOTE ADULT - SUBJECTIVE AND OBJECTIVE BOX
SEKOU AJ 89y Female    Interval HPI:  Patient had no new complaints overnight. Patient denied any fever, chills, night sweats. Patient is at baseline dementia oriented to person and place and not time.      Patient seen and examined at bedside:    T(C): 36.7 (09-11-18 @ 12:25), Max: 37 (09-10-18 @ 21:21)  HR: 47 (09-11-18 @ 12:25) (47 - 63)  BP: 151/72 (09-11-18 @ 12:25) (136/80 - 158/73)  RR: 16 (09-11-18 @ 12:25) (15 - 16)  SpO2: 96% (09-11-18 @ 12:25) (96% - 98%)    Review of systems negative except as mentioned above    acetaminophen   Tablet. 650 milliGRAM(s) Oral every 6 hours PRN  amLODIPine   Tablet 2.5 milliGRAM(s) Oral daily  bisacodyl Suppository 10 milliGRAM(s) Rectal daily PRN  docusate sodium 100 milliGRAM(s) Oral two times a day  escitalopram 5 milliGRAM(s) Oral daily  heparin  Injectable 5000 Unit(s) SubCutaneous every 12 hours  levothyroxine 25 MICROGram(s) Oral daily  lisinopril 40 milliGRAM(s) Oral daily  mirtazapine 15 milliGRAM(s) Oral daily  pantoprazole    Tablet 40 milliGRAM(s) Oral before breakfast  polyethylene glycol 3350 17 Gram(s) Oral daily  QUEtiapine 25 milliGRAM(s) Oral at bedtime PRN  senna 2 Tablet(s) Oral at bedtime      Physical Exam:    GENERAL: Frail, lying in bed in an improved mood from yesterday, NAD  HEENT: NC/AT, MMM, PERRL  NECK: Supple, no LAD  RESPIRATORY: CTAB, adequate depth and effort  CV: S1S2 appreciated, RRR, no m/r/g  GI: Soft, NT/ND, normal active bowel sounds  EXT: Right arm in sling, 2+ pulses in upper and lower ext B/L, no cyanosis, no edema  NEURO: AOx2 (only oriented to person and place - hospital), cn ii - xii intact , 1/5 str all 4 ext. sensation intact bilaterally, patient is debilatated and unable to ambulate  MSK: Decreased bulk and tone, NROM    Labs:                CAPILLARY BLOOD GLUCOSE                Imaging:

## 2018-09-11 NOTE — PROGRESS NOTE ADULT - PROBLEM SELECTOR PROBLEM 4
GERD (gastroesophageal reflux disease)
HTN (hypertension)

## 2018-09-12 VITALS
RESPIRATION RATE: 16 BRPM | TEMPERATURE: 98 F | OXYGEN SATURATION: 96 % | HEART RATE: 49 BPM | SYSTOLIC BLOOD PRESSURE: 114 MMHG | DIASTOLIC BLOOD PRESSURE: 61 MMHG

## 2018-09-12 PROCEDURE — 72125 CT NECK SPINE W/O DYE: CPT

## 2018-09-12 PROCEDURE — 97161 PT EVAL LOW COMPLEX 20 MIN: CPT

## 2018-09-12 PROCEDURE — 85610 PROTHROMBIN TIME: CPT

## 2018-09-12 PROCEDURE — 97116 GAIT TRAINING THERAPY: CPT

## 2018-09-12 PROCEDURE — 82962 GLUCOSE BLOOD TEST: CPT

## 2018-09-12 PROCEDURE — 99239 HOSP IP/OBS DSCHRG MGMT >30: CPT

## 2018-09-12 PROCEDURE — 85027 COMPLETE CBC AUTOMATED: CPT

## 2018-09-12 PROCEDURE — 81001 URINALYSIS AUTO W/SCOPE: CPT

## 2018-09-12 PROCEDURE — 90471 IMMUNIZATION ADMIN: CPT

## 2018-09-12 PROCEDURE — 85730 THROMBOPLASTIN TIME PARTIAL: CPT

## 2018-09-12 PROCEDURE — 73030 X-RAY EXAM OF SHOULDER: CPT

## 2018-09-12 PROCEDURE — 71045 X-RAY EXAM CHEST 1 VIEW: CPT

## 2018-09-12 PROCEDURE — 85025 COMPLETE CBC W/AUTO DIFF WBC: CPT

## 2018-09-12 PROCEDURE — 80053 COMPREHEN METABOLIC PANEL: CPT

## 2018-09-12 PROCEDURE — 97530 THERAPEUTIC ACTIVITIES: CPT

## 2018-09-12 PROCEDURE — 80048 BASIC METABOLIC PNL TOTAL CA: CPT

## 2018-09-12 PROCEDURE — 99285 EMERGENCY DEPT VISIT HI MDM: CPT | Mod: 25

## 2018-09-12 PROCEDURE — 90662 IIV NO PRSV INCREASED AG IM: CPT

## 2018-09-12 PROCEDURE — 72170 X-RAY EXAM OF PELVIS: CPT

## 2018-09-12 PROCEDURE — 36415 COLL VENOUS BLD VENIPUNCTURE: CPT

## 2018-09-12 PROCEDURE — 93005 ELECTROCARDIOGRAM TRACING: CPT

## 2018-09-12 PROCEDURE — 87086 URINE CULTURE/COLONY COUNT: CPT

## 2018-09-12 PROCEDURE — 83735 ASSAY OF MAGNESIUM: CPT

## 2018-09-12 PROCEDURE — 90715 TDAP VACCINE 7 YRS/> IM: CPT

## 2018-09-12 PROCEDURE — 74018 RADEX ABDOMEN 1 VIEW: CPT

## 2018-09-12 PROCEDURE — 84443 ASSAY THYROID STIM HORMONE: CPT

## 2018-09-12 PROCEDURE — 70450 CT HEAD/BRAIN W/O DYE: CPT

## 2018-09-12 PROCEDURE — 84484 ASSAY OF TROPONIN QUANT: CPT

## 2018-09-12 RX ORDER — POLYETHYLENE GLYCOL 3350 17 G/17G
17 POWDER, FOR SOLUTION ORAL
Qty: 0 | Refills: 0 | COMMUNITY
Start: 2018-09-12

## 2018-09-12 RX ORDER — POLYETHYLENE GLYCOL 3350 17 G/17G
17 POWDER, FOR SOLUTION ORAL
Qty: 0 | Refills: 0 | Status: DISCONTINUED | OUTPATIENT
Start: 2018-09-12 | End: 2018-09-12

## 2018-09-12 RX ADMIN — ESCITALOPRAM OXALATE 5 MILLIGRAM(S): 10 TABLET, FILM COATED ORAL at 11:24

## 2018-09-12 RX ADMIN — PANTOPRAZOLE SODIUM 40 MILLIGRAM(S): 20 TABLET, DELAYED RELEASE ORAL at 06:21

## 2018-09-12 RX ADMIN — Medication 650 MILLIGRAM(S): at 03:57

## 2018-09-12 RX ADMIN — AMLODIPINE BESYLATE 2.5 MILLIGRAM(S): 2.5 TABLET ORAL at 06:21

## 2018-09-12 RX ADMIN — LISINOPRIL 40 MILLIGRAM(S): 2.5 TABLET ORAL at 06:21

## 2018-09-12 RX ADMIN — Medication 650 MILLIGRAM(S): at 06:20

## 2018-09-12 RX ADMIN — Medication 650 MILLIGRAM(S): at 10:33

## 2018-09-12 RX ADMIN — HEPARIN SODIUM 5000 UNIT(S): 5000 INJECTION INTRAVENOUS; SUBCUTANEOUS at 06:21

## 2018-09-12 RX ADMIN — POLYETHYLENE GLYCOL 3350 17 GRAM(S): 17 POWDER, FOR SOLUTION ORAL at 11:24

## 2018-09-12 RX ADMIN — Medication 100 MILLIGRAM(S): at 06:21

## 2018-09-12 RX ADMIN — MIRTAZAPINE 15 MILLIGRAM(S): 45 TABLET, ORALLY DISINTEGRATING ORAL at 11:24

## 2018-09-12 RX ADMIN — Medication 25 MICROGRAM(S): at 06:21
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
## 2018-09-17 DIAGNOSIS — F32.9 MAJOR DEPRESSIVE DISORDER, SINGLE EPISODE, UNSPECIFIED: ICD-10-CM

## 2018-09-17 DIAGNOSIS — S42.001A FRACTURE OF UNSPECIFIED PART OF RIGHT CLAVICLE, INITIAL ENCOUNTER FOR CLOSED FRACTURE: ICD-10-CM

## 2018-09-17 DIAGNOSIS — R33.9 RETENTION OF URINE, UNSPECIFIED: ICD-10-CM

## 2018-09-17 DIAGNOSIS — K21.9 GASTRO-ESOPHAGEAL REFLUX DISEASE WITHOUT ESOPHAGITIS: ICD-10-CM

## 2018-09-17 DIAGNOSIS — N39.0 URINARY TRACT INFECTION, SITE NOT SPECIFIED: ICD-10-CM

## 2018-09-17 DIAGNOSIS — R00.1 BRADYCARDIA, UNSPECIFIED: ICD-10-CM

## 2018-09-17 DIAGNOSIS — S00.00XA UNSPECIFIED SUPERFICIAL INJURY OF SCALP, INITIAL ENCOUNTER: ICD-10-CM

## 2018-09-17 DIAGNOSIS — M25.519 PAIN IN UNSPECIFIED SHOULDER: ICD-10-CM
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
## 2018-09-17 DIAGNOSIS — R53.81 OTHER MALAISE: ICD-10-CM

## 2018-09-17 DIAGNOSIS — E03.9 HYPOTHYROIDISM, UNSPECIFIED: ICD-10-CM

## 2018-09-17 DIAGNOSIS — I49.3 VENTRICULAR PREMATURE DEPOLARIZATION: ICD-10-CM

## 2018-09-17 DIAGNOSIS — F03.90 UNSPECIFIED DEMENTIA WITHOUT BEHAVIORAL DISTURBANCE: ICD-10-CM

## 2018-09-17 DIAGNOSIS — S00.03XA CONTUSION OF SCALP, INITIAL ENCOUNTER: ICD-10-CM

## 2018-09-17 DIAGNOSIS — N18.3 CHRONIC KIDNEY DISEASE, STAGE 3 (MODERATE): ICD-10-CM

## 2018-09-17 DIAGNOSIS — I12.9 HYPERTENSIVE CHRONIC KIDNEY DISEASE WITH STAGE 1 THROUGH STAGE 4 CHRONIC KIDNEY DISEASE, OR UNSPECIFIED CHRONIC KIDNEY DISEASE: ICD-10-CM

## 2019-12-13 NOTE — DISCHARGE NOTE ADULT - CARE PLAN
12/13/19, tc to pt, will continue current dose, inr due 4 weeks   bharathi
Principal Discharge DX:	Fall  Goal:	Decreasing risk of fall  Assessment and plan of treatment:	You were admitted to the hospital because you fell and broke your clavicle. To treat it we put your arm in a sling. It's important that you take your time in moving around and use of assistive equipment if you need it to prevent falls.  Secondary Diagnosis:	UTI (urinary tract infection)  Goal:	Hygiene  Assessment and plan of treatment:	While you were in the hospital it was found that had an infection in your urinary tract. You were treated with 3 days of antibiotics and your infection cleared. It is important to practice good hygiene to prevent recurrence because these infections can lead to more serious blood infections.  Secondary Diagnosis:	Acute urinary retention  Goal:	Spontaneous Urination  Assessment and plan of treatment:	During your hospital stay, you retained urine in your bladder. We performed a Trial of Void to see if you would void spontaneously, after the trial you were still retaining urine. So we inserted a Shankar catheter to make sure you were voiding.

## 2020-06-01 NOTE — PROGRESS NOTE ADULT - PROBLEM SELECTOR PLAN 1
Addended by: BURTON GUILLEN on: 6/1/2020 05:51 PM     Modules accepted: Orders     -Failed (first bladder scan revealed 900cc and second bladder scan 6 hours later revealed greater than 400cc of urine). TOV, straight catheterized and urine cx collected, Shankar now in place  -DC with Shankar in place

## 2020-06-06 NOTE — DISCHARGE NOTE ADULT - NSFTFSERV2RD_GEN_ALL_CORE
If provider orders tests at today's visit, patient would like to be contacted via [email or web address].  If to contact patient by phone, patient's preferred phone # is [phone] (Cell) and it is OK to leave message on voice mail or with family member.  If medications are ordered at today's visit, the pharmacy name/location patient would like them to be sent to is   [id number] [place], [place] - [id number] AT [facility]  [id number]  [facility] [address]  Phone: [phone] Fax: [phone]       Physical Therapy/Nursing

## 2020-06-25 NOTE — ED PROVIDER NOTE - PRIOR EKG STATUS
Oct 14, 2015:  NSR 71 with OLVIN, APCs Drysol Counseling:  I discussed with the patient the risks of drysol/aluminum chloride including but not limited to skin rash, itching, irritation, burning.

## 2021-07-09 NOTE — PROGRESS NOTE ADULT - PROBLEM/PLAN-6
DISPLAY PLAN FREE TEXT
today

## 2021-10-18 NOTE — DISCHARGE NOTE ADULT - NSFTFATTENDCERTRD_GEN_ALL_CORE
----- Message from BRUCE Marin sent at 10/18/2021  8:50 AM CDT -----  Please call mom and indicate that the urine culture does not show true bacteria associated with urinary tract infection, which is reassuring.  They should continue with the Vitamin A&D ointment, start Benefiber daily, and obtain a renal and bladder ultrasound.  We will call them with those results.    My signature below certifies that the above stated patient is homebound and upon completion of the Face-To-Face encounter, has the need for intermittent skilled nursing, physical therapy and/or speech or occupational therapy services in their home for their current diagnosis as outlined in their initial plan of care. These services will continue to be monitored by myself or another physician.

## 2022-01-24 NOTE — DIETITIAN INITIAL EVALUATION ADULT. - PHYSICAL APPEARANCE
Retention Suture Bite Size: 3 mm underweight/appears malnourished - noted to have clavicular wasting and protruding/squared shoulders

## 2022-04-18 NOTE — DISCHARGE NOTE ADULT - MEDICATION SUMMARY - MEDICATIONS TO TAKE
Denies known Latex allergy or symptoms of Latex sensitivity.  CC: patient here for Visual Field taped and untaped per Dr. Bobby and Dr. Sanabria. Visual Field instructions reviewed from manual. Right eye tested first untaped, then repeated with brow taped, then repeated with lid and brow taped; then repeated left eye in same sequence. Patient had eye exam last summer.  Social History     Tobacco Use   Smoking Status Never Smoker   Smokeless Tobacco Never Used     Verified pharmacy ([facility]) and PCP  OK to leave message with results at [phone]   I will START or STAY ON the medications listed below when I get home from the hospital:    aspirin 81 mg oral tablet, chewable  -- 1 tab(s) by mouth once a day  -- Indication: For Atherosclerosis    lisinopril  -- 40 milligram(s) by mouth once a day  -- Indication: For Hypertension    escitalopram  -- 5 milligram(s) by mouth once a day  -- Indication: For Depression    mirtazapine  -- 15 milligram(s) by mouth once a day  -- Indication: For Depression    amLODIPine  -- 2.5 milligram(s) by mouth once a day  -- Indication: For Hypertension    Lasix 20 mg oral tablet  -- 1 tab(s) by mouth once a day  -- Indication: For Hypertension    raNITIdine 150 mg oral tablet  -- 1 tab(s) by mouth 2 times a day  -- Indication: For GERD (gastroesophageal reflux disease)    esomeprazole  -- 40 milligram(s) by mouth once a day  -- Indication: For GERD (gastroesophageal reflux disease)    NP Thyroid 30 mg oral tablet  -- 1 tab(s) by mouth once a day  -- Indication: For Hypertension I will START or STAY ON the medications listed below when I get home from the hospital:    aspirin 81 mg oral tablet, chewable  -- 1 tab(s) by mouth once a day  -- Indication: For Atherosclerosis    lisinopril  -- 40 milligram(s) by mouth once a day  -- Indication: For Hypertension    escitalopram  -- 5 milligram(s) by mouth once a day  -- Indication: For Depression    mirtazapine  -- 15 milligram(s) by mouth once a day  -- Indication: For Depression    amLODIPine  -- 2.5 milligram(s) by mouth once a day  -- Indication: For Hypertension    Lasix 20 mg oral tablet  -- 1 tab(s) by mouth once a day  -- Indication: For Hypertension    raNITIdine 150 mg oral tablet  -- 1 tab(s) by mouth 2 times a day  -- Indication: For GERD (gastroesophageal reflux disease)    polyethylene glycol 3350 oral powder for reconstitution  -- 17 gram(s) by mouth 2 times a day  -- Indication: For Constipation    esomeprazole  -- 40 milligram(s) by mouth once a day  -- Indication: For GERD (gastroesophageal reflux disease)    NP Thyroid 30 mg oral tablet  -- 1 tab(s) by mouth once a day  -- Indication: For Hypertension

## 2022-10-18 NOTE — PATIENT PROFILE ADULT. - AS SC BRADEN MOISTURE
(3) occasionally moist Hydroxychloroquine Pregnancy And Lactation Text: This medication has been shown to cause fetal harm but it isn't assigned a Pregnancy Risk Category. There are small amounts excreted in breast milk.